# Patient Record
Sex: FEMALE | Race: WHITE | Employment: OTHER | URBAN - METROPOLITAN AREA
[De-identification: names, ages, dates, MRNs, and addresses within clinical notes are randomized per-mention and may not be internally consistent; named-entity substitution may affect disease eponyms.]

---

## 2017-03-02 ENCOUNTER — HOSPITAL ENCOUNTER (INPATIENT)
Age: 67
LOS: 4 days | Discharge: NURSING, REHAB OR LONG-TERM CARE | DRG: 494 | End: 2017-03-06
Attending: ORTHOPAEDIC SURGERY | Admitting: ORTHOPAEDIC SURGERY
Payer: MEDICARE

## 2017-03-02 ENCOUNTER — APPOINTMENT (OUTPATIENT)
Dept: GENERAL RADIOLOGY | Age: 67
DRG: 494 | End: 2017-03-02
Attending: ORTHOPAEDIC SURGERY
Payer: MEDICARE

## 2017-03-02 PROBLEM — S82.892A BILATERAL ANKLE FRACTURES: Status: ACTIVE | Noted: 2017-03-02

## 2017-03-02 PROBLEM — S82.891A BILATERAL ANKLE FRACTURES: Status: ACTIVE | Noted: 2017-03-02

## 2017-03-02 LAB
ANION GAP BLD CALC-SCNC: 5 MMOL/L (ref 5–15)
ATRIAL RATE: 70 BPM
BASOPHILS # BLD AUTO: 0 K/UL (ref 0–0.1)
BASOPHILS # BLD: 0 % (ref 0–1)
BUN SERPL-MCNC: 15 MG/DL (ref 6–20)
BUN/CREAT SERPL: 20 (ref 12–20)
CALCIUM SERPL-MCNC: 8.9 MG/DL (ref 8.5–10.1)
CALCULATED P AXIS, ECG09: 66 DEGREES
CALCULATED R AXIS, ECG10: 70 DEGREES
CALCULATED T AXIS, ECG11: 62 DEGREES
CHLORIDE SERPL-SCNC: 101 MMOL/L (ref 97–108)
CO2 SERPL-SCNC: 30 MMOL/L (ref 21–32)
CREAT SERPL-MCNC: 0.74 MG/DL (ref 0.55–1.02)
DIAGNOSIS, 93000: NORMAL
DIFFERENTIAL METHOD BLD: NORMAL
EOSINOPHIL # BLD: 0.1 K/UL (ref 0–0.4)
EOSINOPHIL NFR BLD: 1 % (ref 0–7)
ERYTHROCYTE [DISTWIDTH] IN BLOOD BY AUTOMATED COUNT: 14.3 % (ref 11.5–14.5)
GLUCOSE SERPL-MCNC: 97 MG/DL (ref 65–100)
HCT VFR BLD AUTO: 41.3 % (ref 35–47)
HGB BLD-MCNC: 14.2 G/DL (ref 11.5–16)
LYMPHOCYTES # BLD AUTO: 25 % (ref 12–49)
LYMPHOCYTES # BLD: 2.2 K/UL (ref 0.8–3.5)
MCH RBC QN AUTO: 30.9 PG (ref 26–34)
MCHC RBC AUTO-ENTMCNC: 34.4 G/DL (ref 30–36.5)
MCV RBC AUTO: 89.8 FL (ref 80–99)
MONOCYTES # BLD: 0.5 K/UL (ref 0–1)
MONOCYTES NFR BLD AUTO: 6 % (ref 5–13)
NEUTS SEG # BLD: 5.8 K/UL (ref 1.8–8)
NEUTS SEG NFR BLD AUTO: 68 % (ref 32–75)
P-R INTERVAL, ECG05: 126 MS
PLATELET # BLD AUTO: 302 K/UL (ref 150–400)
POTASSIUM SERPL-SCNC: NORMAL MMOL/L (ref 3.5–5.1)
Q-T INTERVAL, ECG07: 424 MS
QRS DURATION, ECG06: 90 MS
QTC CALCULATION (BEZET), ECG08: 457 MS
RBC # BLD AUTO: 4.6 M/UL (ref 3.8–5.2)
RBC MORPH BLD: NORMAL
RBC MORPH BLD: NORMAL
SODIUM SERPL-SCNC: 136 MMOL/L (ref 136–145)
VENTRICULAR RATE, ECG03: 70 BPM
WBC # BLD AUTO: 8.6 K/UL (ref 3.6–11)
WBC MORPH BLD: NORMAL

## 2017-03-02 PROCEDURE — 85025 COMPLETE CBC W/AUTO DIFF WBC: CPT | Performed by: ORTHOPAEDIC SURGERY

## 2017-03-02 PROCEDURE — 0QSG04Z REPOSITION RIGHT TIBIA WITH INTERNAL FIXATION DEVICE, OPEN APPROACH: ICD-10-PCS | Performed by: ORTHOPAEDIC SURGERY

## 2017-03-02 PROCEDURE — 36415 COLL VENOUS BLD VENIPUNCTURE: CPT | Performed by: ORTHOPAEDIC SURGERY

## 2017-03-02 PROCEDURE — 71010 XR CHEST PORT: CPT

## 2017-03-02 PROCEDURE — 80048 BASIC METABOLIC PNL TOTAL CA: CPT | Performed by: ORTHOPAEDIC SURGERY

## 2017-03-02 PROCEDURE — 74011250636 HC RX REV CODE- 250/636: Performed by: ORTHOPAEDIC SURGERY

## 2017-03-02 PROCEDURE — 93005 ELECTROCARDIOGRAM TRACING: CPT

## 2017-03-02 PROCEDURE — 0QSJ04Z REPOSITION RIGHT FIBULA WITH INTERNAL FIXATION DEVICE, OPEN APPROACH: ICD-10-PCS | Performed by: ORTHOPAEDIC SURGERY

## 2017-03-02 PROCEDURE — 65270000029 HC RM PRIVATE

## 2017-03-02 PROCEDURE — 74011250637 HC RX REV CODE- 250/637: Performed by: ORTHOPAEDIC SURGERY

## 2017-03-02 RX ORDER — HYDROCODONE BITARTRATE AND ACETAMINOPHEN 5; 325 MG/1; MG/1
1 TABLET ORAL
Status: DISCONTINUED | OUTPATIENT
Start: 2017-03-02 | End: 2017-03-03 | Stop reason: SDUPTHER

## 2017-03-02 RX ORDER — SODIUM CHLORIDE 0.9 % (FLUSH) 0.9 %
5 SYRINGE (ML) INJECTION EVERY 8 HOURS
Status: DISCONTINUED | OUTPATIENT
Start: 2017-03-02 | End: 2017-03-06 | Stop reason: HOSPADM

## 2017-03-02 RX ORDER — HYDROCODONE BITARTRATE AND ACETAMINOPHEN 5; 325 MG/1; MG/1
2 TABLET ORAL
Status: DISCONTINUED | OUTPATIENT
Start: 2017-03-02 | End: 2017-03-06 | Stop reason: HOSPADM

## 2017-03-02 RX ORDER — SODIUM CHLORIDE 0.9 % (FLUSH) 0.9 %
5 SYRINGE (ML) INJECTION AS NEEDED
Status: DISCONTINUED | OUTPATIENT
Start: 2017-03-02 | End: 2017-03-06 | Stop reason: HOSPADM

## 2017-03-02 RX ORDER — SODIUM CHLORIDE 9 MG/ML
50 INJECTION, SOLUTION INTRAVENOUS CONTINUOUS
Status: DISCONTINUED | OUTPATIENT
Start: 2017-03-02 | End: 2017-03-03

## 2017-03-02 RX ADMIN — SODIUM CHLORIDE 50 ML/HR: 900 INJECTION, SOLUTION INTRAVENOUS at 15:57

## 2017-03-02 RX ADMIN — HYDROCODONE BITARTRATE AND ACETAMINOPHEN 2 TABLET: 5; 325 TABLET ORAL at 21:38

## 2017-03-02 RX ADMIN — Medication 5 ML: at 16:01

## 2017-03-02 RX ADMIN — HYDROCODONE BITARTRATE AND ACETAMINOPHEN 2 TABLET: 5; 325 TABLET ORAL at 17:52

## 2017-03-02 NOTE — PROGRESS NOTES
Bedside and Verbal shift change report given to Martine Potts  (oncoming nurse) by Armani Mcguire (offgoing nurse). Report included the following information SBAR and Kardex.

## 2017-03-03 ENCOUNTER — APPOINTMENT (OUTPATIENT)
Dept: GENERAL RADIOLOGY | Age: 67
DRG: 494 | End: 2017-03-03
Attending: ORTHOPAEDIC SURGERY
Payer: MEDICARE

## 2017-03-03 ENCOUNTER — ANESTHESIA (OUTPATIENT)
Dept: SURGERY | Age: 67
DRG: 494 | End: 2017-03-03
Payer: MEDICARE

## 2017-03-03 ENCOUNTER — ANESTHESIA EVENT (OUTPATIENT)
Dept: SURGERY | Age: 67
DRG: 494 | End: 2017-03-03
Payer: MEDICARE

## 2017-03-03 PROCEDURE — 76000 FLUOROSCOPY <1 HR PHYS/QHP: CPT

## 2017-03-03 PROCEDURE — 77030002916 HC SUT ETHLN J&J -A: Performed by: ORTHOPAEDIC SURGERY

## 2017-03-03 PROCEDURE — 77030016472 HC BIT DRL QC2 SYNT -C: Performed by: ORTHOPAEDIC SURGERY

## 2017-03-03 PROCEDURE — 74011250636 HC RX REV CODE- 250/636

## 2017-03-03 PROCEDURE — 74011250637 HC RX REV CODE- 250/637: Performed by: ORTHOPAEDIC SURGERY

## 2017-03-03 PROCEDURE — C1713 ANCHOR/SCREW BN/BN,TIS/BN: HCPCS | Performed by: ORTHOPAEDIC SURGERY

## 2017-03-03 PROCEDURE — 76010000149 HC OR TIME 1 TO 1.5 HR: Performed by: ORTHOPAEDIC SURGERY

## 2017-03-03 PROCEDURE — 94640 AIRWAY INHALATION TREATMENT: CPT

## 2017-03-03 PROCEDURE — 74011250636 HC RX REV CODE- 250/636: Performed by: ORTHOPAEDIC SURGERY

## 2017-03-03 PROCEDURE — 65270000029 HC RM PRIVATE

## 2017-03-03 PROCEDURE — 77030003862 HC BIT DRL SYNT -B: Performed by: ORTHOPAEDIC SURGERY

## 2017-03-03 PROCEDURE — 77030011640 HC PAD GRND REM COVD -A: Performed by: ORTHOPAEDIC SURGERY

## 2017-03-03 PROCEDURE — 77030000032 HC CUF TRNQT ZIMM -B: Performed by: ORTHOPAEDIC SURGERY

## 2017-03-03 PROCEDURE — 74011000250 HC RX REV CODE- 250: Performed by: ORTHOPAEDIC SURGERY

## 2017-03-03 PROCEDURE — 73600 X-RAY EXAM OF ANKLE: CPT

## 2017-03-03 PROCEDURE — 77030031139 HC SUT VCRL2 J&J -A: Performed by: ORTHOPAEDIC SURGERY

## 2017-03-03 PROCEDURE — 77030028224 HC PDNG CST BSNM -A: Performed by: ORTHOPAEDIC SURGERY

## 2017-03-03 PROCEDURE — 74011000250 HC RX REV CODE- 250

## 2017-03-03 PROCEDURE — 76210000016 HC OR PH I REC 1 TO 1.5 HR: Performed by: ORTHOPAEDIC SURGERY

## 2017-03-03 PROCEDURE — 77030018836 HC SOL IRR NACL ICUM -A: Performed by: ORTHOPAEDIC SURGERY

## 2017-03-03 PROCEDURE — 74011250636 HC RX REV CODE- 250/636: Performed by: ANESTHESIOLOGY

## 2017-03-03 PROCEDURE — 76060000033 HC ANESTHESIA 1 TO 1.5 HR: Performed by: ORTHOPAEDIC SURGERY

## 2017-03-03 PROCEDURE — 77030010509 HC AIRWY LMA MSK TELE -A: Performed by: ANESTHESIOLOGY

## 2017-03-03 PROCEDURE — 77030002933 HC SUT MCRYL J&J -A: Performed by: ORTHOPAEDIC SURGERY

## 2017-03-03 PROCEDURE — 77030016470 HC BIT DRL QC1 SYNT -C: Performed by: ORTHOPAEDIC SURGERY

## 2017-03-03 PROCEDURE — 77030004164 HC PLT SYNT -C: Performed by: ORTHOPAEDIC SURGERY

## 2017-03-03 DEVICE — 3.5MM CORTEX SCREW SELF-TAPPING 18MM: Type: IMPLANTABLE DEVICE | Site: ANKLE | Status: FUNCTIONAL

## 2017-03-03 DEVICE — 3.5MM CORTEX SCREW SELF-TAPPING 14MM: Type: IMPLANTABLE DEVICE | Site: ANKLE | Status: FUNCTIONAL

## 2017-03-03 DEVICE — 3.5MM LOCKING SCREW SLF-TPNG W/STARDRIVE(TM) RECESS 12MM: Type: IMPLANTABLE DEVICE | Site: ANKLE | Status: FUNCTIONAL

## 2017-03-03 DEVICE — PLATE BNE L81MM 7 H S STL 1/3 TBLR LOK COMPR W/ CLLR FOR: Type: IMPLANTABLE DEVICE | Site: ANKLE | Status: FUNCTIONAL

## 2017-03-03 DEVICE — SCREW BNE L45MM DIA4MM CANC S STL PARTIALLY THRD SM HEX: Type: IMPLANTABLE DEVICE | Site: ANKLE | Status: FUNCTIONAL

## 2017-03-03 DEVICE — SCREW BNE L14MM DIA3.5MM CORT S STL ST LOK FULL THRD: Type: IMPLANTABLE DEVICE | Site: ANKLE | Status: FUNCTIONAL

## 2017-03-03 DEVICE — SCREW BNE L16MM DIA3.5MM CORT S STL ST NONCANNULATED LOK: Type: IMPLANTABLE DEVICE | Site: ANKLE | Status: FUNCTIONAL

## 2017-03-03 RX ORDER — ARFORMOTEROL TARTRATE 15 UG/2ML
15 SOLUTION RESPIRATORY (INHALATION)
Status: DISCONTINUED | OUTPATIENT
Start: 2017-03-03 | End: 2017-03-06 | Stop reason: HOSPADM

## 2017-03-03 RX ORDER — LIDOCAINE HYDROCHLORIDE 10 MG/ML
0.1 INJECTION, SOLUTION EPIDURAL; INFILTRATION; INTRACAUDAL; PERINEURAL AS NEEDED
Status: DISCONTINUED | OUTPATIENT
Start: 2017-03-03 | End: 2017-03-03 | Stop reason: HOSPADM

## 2017-03-03 RX ORDER — FENTANYL CITRATE 50 UG/ML
50 INJECTION, SOLUTION INTRAMUSCULAR; INTRAVENOUS AS NEEDED
Status: DISCONTINUED | OUTPATIENT
Start: 2017-03-03 | End: 2017-03-03 | Stop reason: HOSPADM

## 2017-03-03 RX ORDER — SODIUM CHLORIDE, SODIUM LACTATE, POTASSIUM CHLORIDE, CALCIUM CHLORIDE 600; 310; 30; 20 MG/100ML; MG/100ML; MG/100ML; MG/100ML
100 INJECTION, SOLUTION INTRAVENOUS CONTINUOUS
Status: DISCONTINUED | OUTPATIENT
Start: 2017-03-03 | End: 2017-03-03 | Stop reason: HOSPADM

## 2017-03-03 RX ORDER — HYDROCODONE BITARTRATE AND ACETAMINOPHEN 10; 325 MG/1; MG/1
1 TABLET ORAL
Status: DISCONTINUED | OUTPATIENT
Start: 2017-03-03 | End: 2017-03-06 | Stop reason: HOSPADM

## 2017-03-03 RX ORDER — CEFAZOLIN SODIUM 1 G/3ML
INJECTION, POWDER, FOR SOLUTION INTRAMUSCULAR; INTRAVENOUS AS NEEDED
Status: DISCONTINUED | OUTPATIENT
Start: 2017-03-03 | End: 2017-03-03 | Stop reason: HOSPADM

## 2017-03-03 RX ORDER — ATROPINE SULFATE 0.4 MG/ML
INJECTION, SOLUTION ENDOTRACHEAL; INTRAMEDULLARY; INTRAMUSCULAR; INTRAVENOUS; SUBCUTANEOUS AS NEEDED
Status: DISCONTINUED | OUTPATIENT
Start: 2017-03-03 | End: 2017-03-03 | Stop reason: HOSPADM

## 2017-03-03 RX ORDER — ACETAMINOPHEN 325 MG/1
650 TABLET ORAL
Status: DISCONTINUED | OUTPATIENT
Start: 2017-03-03 | End: 2017-03-06 | Stop reason: HOSPADM

## 2017-03-03 RX ORDER — ALBUTEROL SULFATE 0.83 MG/ML
2.5 SOLUTION RESPIRATORY (INHALATION)
Status: DISCONTINUED | OUTPATIENT
Start: 2017-03-03 | End: 2017-03-06 | Stop reason: HOSPADM

## 2017-03-03 RX ORDER — SODIUM CHLORIDE 0.9 % (FLUSH) 0.9 %
5-10 SYRINGE (ML) INJECTION AS NEEDED
Status: DISCONTINUED | OUTPATIENT
Start: 2017-03-03 | End: 2017-03-03 | Stop reason: HOSPADM

## 2017-03-03 RX ORDER — FLUTICASONE PROPIONATE AND SALMETEROL 100; 50 UG/1; UG/1
1 POWDER RESPIRATORY (INHALATION)
Status: DISCONTINUED | OUTPATIENT
Start: 2017-03-03 | End: 2017-03-03 | Stop reason: CLARIF

## 2017-03-03 RX ORDER — ONDANSETRON 2 MG/ML
4 INJECTION INTRAMUSCULAR; INTRAVENOUS
Status: DISCONTINUED | OUTPATIENT
Start: 2017-03-03 | End: 2017-03-06 | Stop reason: HOSPADM

## 2017-03-03 RX ORDER — LEVOTHYROXINE SODIUM 100 UG/1
100 TABLET ORAL
Status: DISCONTINUED | OUTPATIENT
Start: 2017-03-04 | End: 2017-03-06 | Stop reason: HOSPADM

## 2017-03-03 RX ORDER — MORPHINE SULFATE 2 MG/ML
2-4 INJECTION, SOLUTION INTRAMUSCULAR; INTRAVENOUS
Status: DISCONTINUED | OUTPATIENT
Start: 2017-03-03 | End: 2017-03-03 | Stop reason: ALTCHOICE

## 2017-03-03 RX ORDER — MIDAZOLAM HYDROCHLORIDE 1 MG/ML
1 INJECTION, SOLUTION INTRAMUSCULAR; INTRAVENOUS AS NEEDED
Status: DISCONTINUED | OUTPATIENT
Start: 2017-03-03 | End: 2017-03-03 | Stop reason: HOSPADM

## 2017-03-03 RX ORDER — HYDROMORPHONE HYDROCHLORIDE 1 MG/ML
0.5 INJECTION, SOLUTION INTRAMUSCULAR; INTRAVENOUS; SUBCUTANEOUS
Status: DISCONTINUED | OUTPATIENT
Start: 2017-03-03 | End: 2017-03-03 | Stop reason: HOSPADM

## 2017-03-03 RX ORDER — NALOXONE HYDROCHLORIDE 0.4 MG/ML
0.4 INJECTION, SOLUTION INTRAMUSCULAR; INTRAVENOUS; SUBCUTANEOUS AS NEEDED
Status: DISCONTINUED | OUTPATIENT
Start: 2017-03-03 | End: 2017-03-06 | Stop reason: HOSPADM

## 2017-03-03 RX ORDER — MORPHINE SULFATE 10 MG/ML
2 INJECTION, SOLUTION INTRAMUSCULAR; INTRAVENOUS
Status: DISCONTINUED | OUTPATIENT
Start: 2017-03-03 | End: 2017-03-03 | Stop reason: HOSPADM

## 2017-03-03 RX ORDER — SODIUM CHLORIDE 0.9 % (FLUSH) 0.9 %
5-10 SYRINGE (ML) INJECTION EVERY 8 HOURS
Status: DISCONTINUED | OUTPATIENT
Start: 2017-03-03 | End: 2017-03-06 | Stop reason: HOSPADM

## 2017-03-03 RX ORDER — MIDAZOLAM HYDROCHLORIDE 1 MG/ML
0.5 INJECTION, SOLUTION INTRAMUSCULAR; INTRAVENOUS
Status: DISCONTINUED | OUTPATIENT
Start: 2017-03-03 | End: 2017-03-03 | Stop reason: HOSPADM

## 2017-03-03 RX ORDER — SODIUM CHLORIDE 0.9 % (FLUSH) 0.9 %
5-10 SYRINGE (ML) INJECTION AS NEEDED
Status: DISCONTINUED | OUTPATIENT
Start: 2017-03-03 | End: 2017-03-06 | Stop reason: HOSPADM

## 2017-03-03 RX ORDER — ENOXAPARIN SODIUM 100 MG/ML
40 INJECTION SUBCUTANEOUS EVERY 24 HOURS
Status: DISCONTINUED | OUTPATIENT
Start: 2017-03-03 | End: 2017-03-06 | Stop reason: HOSPADM

## 2017-03-03 RX ORDER — SODIUM CHLORIDE 0.9 % (FLUSH) 0.9 %
5-10 SYRINGE (ML) INJECTION EVERY 8 HOURS
Status: DISCONTINUED | OUTPATIENT
Start: 2017-03-03 | End: 2017-03-03 | Stop reason: HOSPADM

## 2017-03-03 RX ORDER — HYDROMORPHONE HYDROCHLORIDE 1 MG/ML
1 INJECTION, SOLUTION INTRAMUSCULAR; INTRAVENOUS; SUBCUTANEOUS
Status: DISCONTINUED | OUTPATIENT
Start: 2017-03-03 | End: 2017-03-06 | Stop reason: HOSPADM

## 2017-03-03 RX ORDER — DEXAMETHASONE SODIUM PHOSPHATE 4 MG/ML
INJECTION, SOLUTION INTRA-ARTICULAR; INTRALESIONAL; INTRAMUSCULAR; INTRAVENOUS; SOFT TISSUE AS NEEDED
Status: DISCONTINUED | OUTPATIENT
Start: 2017-03-03 | End: 2017-03-03 | Stop reason: HOSPADM

## 2017-03-03 RX ORDER — FENTANYL CITRATE 50 UG/ML
25 INJECTION, SOLUTION INTRAMUSCULAR; INTRAVENOUS
Status: DISCONTINUED | OUTPATIENT
Start: 2017-03-03 | End: 2017-03-03 | Stop reason: HOSPADM

## 2017-03-03 RX ORDER — BUDESONIDE 0.5 MG/2ML
500 INHALANT ORAL
Status: DISCONTINUED | OUTPATIENT
Start: 2017-03-03 | End: 2017-03-06 | Stop reason: HOSPADM

## 2017-03-03 RX ORDER — CEFAZOLIN SODIUM IN 0.9 % NACL 2 G/50 ML
2 INTRAVENOUS SOLUTION, PIGGYBACK (ML) INTRAVENOUS EVERY 8 HOURS
Status: COMPLETED | OUTPATIENT
Start: 2017-03-04 | End: 2017-03-04

## 2017-03-03 RX ORDER — ALBUTEROL SULFATE 90 UG/1
2 AEROSOL, METERED RESPIRATORY (INHALATION)
Status: DISCONTINUED | OUTPATIENT
Start: 2017-03-03 | End: 2017-03-03 | Stop reason: CLARIF

## 2017-03-03 RX ORDER — SODIUM CHLORIDE 9 MG/ML
50 INJECTION, SOLUTION INTRAVENOUS CONTINUOUS
Status: DISPENSED | OUTPATIENT
Start: 2017-03-03 | End: 2017-03-04

## 2017-03-03 RX ORDER — PHENYLEPHRINE HCL IN 0.9% NACL 0.4MG/10ML
SYRINGE (ML) INTRAVENOUS AS NEEDED
Status: DISCONTINUED | OUTPATIENT
Start: 2017-03-03 | End: 2017-03-03 | Stop reason: HOSPADM

## 2017-03-03 RX ORDER — MIDAZOLAM HYDROCHLORIDE 1 MG/ML
INJECTION, SOLUTION INTRAMUSCULAR; INTRAVENOUS AS NEEDED
Status: DISCONTINUED | OUTPATIENT
Start: 2017-03-03 | End: 2017-03-03 | Stop reason: HOSPADM

## 2017-03-03 RX ORDER — DIPHENHYDRAMINE HYDROCHLORIDE 50 MG/ML
12.5 INJECTION, SOLUTION INTRAMUSCULAR; INTRAVENOUS AS NEEDED
Status: DISCONTINUED | OUTPATIENT
Start: 2017-03-03 | End: 2017-03-03 | Stop reason: HOSPADM

## 2017-03-03 RX ORDER — ROPIVACAINE HYDROCHLORIDE 5 MG/ML
30 INJECTION, SOLUTION EPIDURAL; INFILTRATION; PERINEURAL AS NEEDED
Status: DISCONTINUED | OUTPATIENT
Start: 2017-03-03 | End: 2017-03-03 | Stop reason: HOSPADM

## 2017-03-03 RX ORDER — LIDOCAINE HYDROCHLORIDE 20 MG/ML
INJECTION, SOLUTION EPIDURAL; INFILTRATION; INTRACAUDAL; PERINEURAL AS NEEDED
Status: DISCONTINUED | OUTPATIENT
Start: 2017-03-03 | End: 2017-03-03 | Stop reason: HOSPADM

## 2017-03-03 RX ORDER — ONDANSETRON 2 MG/ML
4 INJECTION INTRAMUSCULAR; INTRAVENOUS AS NEEDED
Status: DISCONTINUED | OUTPATIENT
Start: 2017-03-03 | End: 2017-03-03 | Stop reason: HOSPADM

## 2017-03-03 RX ORDER — ONDANSETRON 2 MG/ML
INJECTION INTRAMUSCULAR; INTRAVENOUS AS NEEDED
Status: DISCONTINUED | OUTPATIENT
Start: 2017-03-03 | End: 2017-03-03 | Stop reason: HOSPADM

## 2017-03-03 RX ORDER — PROPOFOL 10 MG/ML
INJECTION, EMULSION INTRAVENOUS AS NEEDED
Status: DISCONTINUED | OUTPATIENT
Start: 2017-03-03 | End: 2017-03-03 | Stop reason: HOSPADM

## 2017-03-03 RX ORDER — SODIUM CHLORIDE 9 MG/ML
25 INJECTION, SOLUTION INTRAVENOUS CONTINUOUS
Status: DISCONTINUED | OUTPATIENT
Start: 2017-03-03 | End: 2017-03-03 | Stop reason: HOSPADM

## 2017-03-03 RX ADMIN — LIDOCAINE HYDROCHLORIDE 60 MG: 20 INJECTION, SOLUTION EPIDURAL; INFILTRATION; INTRACAUDAL; PERINEURAL at 15:33

## 2017-03-03 RX ADMIN — MIDAZOLAM HYDROCHLORIDE 2 MG: 1 INJECTION, SOLUTION INTRAMUSCULAR; INTRAVENOUS at 15:25

## 2017-03-03 RX ADMIN — CEFAZOLIN 2 G: 1 INJECTION, POWDER, FOR SOLUTION INTRAMUSCULAR; INTRAVENOUS; PARENTERAL at 23:01

## 2017-03-03 RX ADMIN — ATROPINE SULFATE 0.2 MG: 0.4 INJECTION, SOLUTION ENDOTRACHEAL; INTRAMEDULLARY; INTRAMUSCULAR; INTRAVENOUS; SUBCUTANEOUS at 16:01

## 2017-03-03 RX ADMIN — PROPOFOL 200 MG: 10 INJECTION, EMULSION INTRAVENOUS at 15:33

## 2017-03-03 RX ADMIN — HYDROCODONE BITARTRATE AND ACETAMINOPHEN 1 TABLET: 5; 325 TABLET ORAL at 07:20

## 2017-03-03 RX ADMIN — FENTANYL CITRATE 50 MCG: 50 INJECTION, SOLUTION INTRAMUSCULAR; INTRAVENOUS at 15:16

## 2017-03-03 RX ADMIN — Medication 80 MCG: at 15:38

## 2017-03-03 RX ADMIN — ATROPINE SULFATE 0.2 MG: 0.4 INJECTION, SOLUTION ENDOTRACHEAL; INTRAMEDULLARY; INTRAMUSCULAR; INTRAVENOUS; SUBCUTANEOUS at 15:55

## 2017-03-03 RX ADMIN — ENOXAPARIN SODIUM 40 MG: 40 INJECTION SUBCUTANEOUS at 19:24

## 2017-03-03 RX ADMIN — CEFAZOLIN SODIUM 2 G: 1 INJECTION, POWDER, FOR SOLUTION INTRAMUSCULAR; INTRAVENOUS at 15:40

## 2017-03-03 RX ADMIN — ONDANSETRON 4 MG: 2 INJECTION INTRAMUSCULAR; INTRAVENOUS at 15:44

## 2017-03-03 RX ADMIN — HYDROCODONE BITARTRATE AND ACETAMINOPHEN 1 TABLET: 5; 325 TABLET ORAL at 11:12

## 2017-03-03 RX ADMIN — Medication 2 MG: at 01:25

## 2017-03-03 RX ADMIN — Medication 2 MG: at 13:26

## 2017-03-03 RX ADMIN — SODIUM CHLORIDE 50 ML/HR: 900 INJECTION, SOLUTION INTRAVENOUS at 21:15

## 2017-03-03 RX ADMIN — DEXAMETHASONE SODIUM PHOSPHATE 4 MG: 4 INJECTION, SOLUTION INTRA-ARTICULAR; INTRALESIONAL; INTRAMUSCULAR; INTRAVENOUS; SOFT TISSUE at 15:44

## 2017-03-03 RX ADMIN — BUDESONIDE 500 MCG: 0.5 INHALANT RESPIRATORY (INHALATION) at 19:05

## 2017-03-03 RX ADMIN — Medication 40 MCG: at 16:00

## 2017-03-03 RX ADMIN — SODIUM CHLORIDE, SODIUM LACTATE, POTASSIUM CHLORIDE, AND CALCIUM CHLORIDE 100 ML/HR: 600; 310; 30; 20 INJECTION, SOLUTION INTRAVENOUS at 14:50

## 2017-03-03 RX ADMIN — ARFORMOTEROL TARTRATE 15 MCG: 15 SOLUTION RESPIRATORY (INHALATION) at 19:05

## 2017-03-03 RX ADMIN — SODIUM CHLORIDE 50 ML/HR: 900 INJECTION, SOLUTION INTRAVENOUS at 11:12

## 2017-03-03 RX ADMIN — Medication 2 MG: at 04:01

## 2017-03-03 RX ADMIN — Medication 2 MG: at 09:23

## 2017-03-03 RX ADMIN — Medication 2 MG: at 06:15

## 2017-03-03 RX ADMIN — MIDAZOLAM HYDROCHLORIDE 2 MG: 1 INJECTION, SOLUTION INTRAMUSCULAR; INTRAVENOUS at 15:15

## 2017-03-03 RX ADMIN — HYDROCODONE BITARTRATE AND ACETAMINOPHEN 1 TABLET: 10; 325 TABLET ORAL at 21:15

## 2017-03-03 NOTE — OP NOTES
[address] [facility] [facility] [address]   OP NOTE       Name:  Quentin Guzman   MR#:  [id number]   :  1950   Account #:  [de-identified]    Surgery Date:  2017   Date of Adm:  2017       PREOPERATIVE DIAGNOSIS: Right trimalleolar ankle fracture. POSTOPERATIVE DIAGNOSIS: Right trimalleolar ankle fracture. PROCEDURES PERFORMED: Open reduction and internal fixation,   right trimalleolar ankle fracture. SURGEON: Christine Young. Carmine Ya MD    ANESTHESIA: General.    ESTIMATED BLOOD LOSS: Minimal.    COMPLICATIONS: None. TOTAL TOURNIQUET TIME: 39 minutes, right lower extremity. IMPLANTS: Synthes plates and screws. SPECIMENS REMOVED: none    DISPOSITION: The patient was taken to the recovery room in stable   condition. OPERATIVE INDICATIONS: The patient is a 79-year-old female who   had an injury earlier this week. She was seen at an outside hospital.   Unfortunately, she had a fracture dislocation of her right ankle, which   was reduced and splinted, and she was sent out. When she presented   to my office, we also discovered that she had a 5th metatarsal fracture   of her contralateral left foot. She had had a very difficult time getting   around. She lives alone. We ended up admitting her to the hospital to   take care of all of her injuries. We elevated the right leg. She certainly   would require surgery. We discussed the risks and benefits, and she   wished to go forward with surgical fixation of her right ankle injury, and   informed consent was obtained. OPERATIVE PROCEDURE: The patient was identified in the   preoperative holding area. Right lower extremity was marked with the   patient. All preoperative questions were answered. She was seen by   the anesthesia department, taken to the operating room, transferred to   the operating table in supine position. Once appropriate anesthesia   was obtained, a tourniquet was placed on the right thigh. The right leg   was prepped and draped in the usual sterile fashion. Time-out was   conducted indicating correct operative site and preoperative antibiotics   given prior to incision being made. Next, the leg was elevated,   tourniquet was inflated. Fluoroscopy was brought in and used   throughout the case. Pictures were taken showing unstable ankle   fracture. An incision was made first medially, and dissected down to   the medial malleolus fracture, which was quite comminuted. It was   thoroughly irrigated out, debrided, and prepared for fixation. A pointed   reduction clamp was placed across it with a K-wire as well to secure it,   and my attention was turned lateral. An incision was made over the   fibula, dissected down to an oblique fibular fracture, which was   curetted out, irrigated and prepared for fixation by pulling in-line   traction and using a lobster claw clamp. It was anatomically reduced. A   3.5 lag screw was placed across it. Images were taken in AP and   lateral planes showing appropriate alignment. There was a posterior   malleolar fracture, which was well reduced with the fibula, and was in   good position. A 7-hole, 1/3 tubular plate was placed on the lateral   portion of the fibula. A nonlocking screw was placed first to compress   the plate to the screw, then several locking screws were placed. My   attention was turned back medial, where 2 partially threaded   cancellous screws were placed across the medial malleolus. The K-  wire and reduction clamp were removed. The ankle was then stressed   under Cotton test live fluoroscopy, and was quite stable. No further   fixation was needed. Final images were taken in all planes showing   appropriate alignment of the fracture and placement of hardware. Incisions were irrigated with saline, closed with Vicryl sutures,   Monocryl sutures and nylon sutures in the skin. Sterile dressings were   placed. Bulky De La Torre splint was placed.  The patient was awakened and   taken to the recovery room in stable condition.         MD [facility]   D:  03/03/2017   17:23   T:  03/03/2017   18:01   Job #:  [id number]

## 2017-03-03 NOTE — PERIOP NOTES
Patient: Daria Mayorga MRN: [id number]  SSN: [id number]   YOB: 1950  Age: 79 y.o. Sex: female     Patient is status post Procedure(s):  ANKLE OPEN REDUCTION INTERNAL FIXATION- RIGHT. Surgeon(s) and Role:     * Tanmay Ngo MD - Primary    Local/Dose/Irrigation:  N/A                  Peripheral IV 03/02/17 Left Forearm (Active)   Site Assessment Clean, dry, & intact 3/3/2017  9:48 AM   Phlebitis Assessment 0 3/3/2017  9:48 AM   Infiltration Assessment 0 3/3/2017  9:48 AM   Dressing Status Clean, dry, & intact 3/3/2017  9:48 AM   Dressing Type Transparent 3/3/2017  9:48 AM   Hub Color/Line Status Infusing 3/3/2017  9:48 AM   Alcohol Cap Used Yes 3/3/2017  9:48 AM                           Dressing/Packing:  Wound Ankle Right-DRESSING TYPE: 4 x 4;Cast padding;Elastic bandage; Sutures; Xeroform (03/03/17 1612)  Splint/Cast: Wound Ankle Right-SPLINT TYPE/MATERIAL: Splint, plaster]    Other:

## 2017-03-03 NOTE — PROGRESS NOTES
0025: Paged on call ortho. 0041: 2nd page to on call ortho. 1959: Dr. Peter Guthrie notified of pt pain level 8/9 out of 10. Verbal order to give morphine 2 mg-4 mg IV every 2 hours prn.

## 2017-03-03 NOTE — PROGRESS NOTES
Talked to [facility] [employer] in holding area; gave report per SBAR, MAR, kardex; pre procedure checklist complete and consent has been signed; legs wiped with CHG

## 2017-03-03 NOTE — ROUTINE PROCESS
Bedside shift change report given to Artemio dias RN (oncoming nurse) by Spenser Asher RN(offgoing nurse). Report given with SBAR.

## 2017-03-03 NOTE — ANESTHESIA PREPROCEDURE EVALUATION
Anesthetic History   No history of anesthetic complications            Review of Systems / Medical History  Patient summary reviewed, nursing notes reviewed and pertinent labs reviewed    Pulmonary  Within defined limits          Asthma        Neuro/Psych   Within defined limits           Cardiovascular  Within defined limits                Exercise tolerance: >4 METS     GI/Hepatic/Renal  Within defined limits              Endo/Other      Hypothyroidism  Arthritis and cancer     Other Findings              Physical Exam    Airway  Mallampati: II  TM Distance: 4 - 6 cm  Neck ROM: normal range of motion   Mouth opening: Normal     Cardiovascular  Regular rate and rhythm,  S1 and S2 normal,  no murmur, click, rub, or gallop             Dental  No notable dental hx       Pulmonary  Breath sounds clear to auscultation               Abdominal  GI exam deferred       Other Findings            Anesthetic Plan    ASA: 2  Anesthesia type: general      Post-op pain plan if not by surgeon: peripheral nerve block single    Induction: Intravenous  Anesthetic plan and risks discussed with: Patient

## 2017-03-03 NOTE — PROGRESS NOTES
Bedside and Verbal shift change report given to Claudette RN (oncoming nurse) by Eddie Ortiz RN (offgoing nurse). Report included the following information SBAR, Kardex, ED Summary, Intake/Output, MAR and Recent Results.

## 2017-03-03 NOTE — PROGRESS NOTES
TRANSFER - OUT REPORT:    Verbal report given to Chin Oneil RN on [employer]  being transferred to OR holding) for ordered procedure       Report consisted of patients Situation, Background, Assessment and   Recommendations(SBAR). Information from the following report(s) SBAR, Kardex and MAR was reviewed with the receiving nurse. Lines:   Peripheral IV 03/02/17 Left Forearm (Active)   Site Assessment Clean, dry, & intact 3/3/2017  9:48 AM   Phlebitis Assessment 0 3/3/2017  9:48 AM   Infiltration Assessment 0 3/3/2017  9:48 AM   Dressing Status Clean, dry, & intact 3/3/2017  9:48 AM   Dressing Type Transparent 3/3/2017  9:48 AM   Hub Color/Line Status Infusing 3/3/2017  9:48 AM   Alcohol Cap Used Yes 3/3/2017  9:48 AM        Opportunity for questions and clarification was provided.       Patient transported with:   [employer]

## 2017-03-03 NOTE — PROGRESS NOTES
TRANSFER - IN REPORT:    Verbal report received from [id number] Julia Duggan, RN on [employer]  being received from PACU) for routine post - op      Report consisted of patients Situation, Background, Assessment and   Recommendations(SBAR). Information from the following report(s) SBAR, Kardex and OR Summary was reviewed with the receiving nurse. Opportunity for questions and clarification was provided. Assessment completed upon patients arrival to unit and care assumed.

## 2017-03-03 NOTE — PERIOP NOTES
TRANSFER - OUT REPORT:    Verbal report given to Анна CARDOZO(name) on [employer]  being transferred to 573(unit) for routine post - op       Report consisted of patients Situation, Background, Assessment and   Recommendations(SBAR). Time Pre op antibiotic given:1540  Anesthesia Stop time: na  Merino Present on Transfer to floor:n  Order for Merino on Chart:n    Information from the following report(s) SBAR and Kardex was reviewed with the receiving nurse. Opportunity for questions and clarification was provided. Is the patient on 02? NO       L/Min na       Other na    Is the patient on a monitor? NO    Is the nurse transporting with the patient? NO    Surgical Waiting Area notified of patient's transfer from PACU? YES      The following personal items collected during your admission accompanied patient upon transfer:   Dental Appliance: Dental Appliances: None  Vision: Visual Aid: Glasses, With patient  Hearing Aid:    Jewelry: Jewelry: None  Clothing: Clothing: At bedside  Other Valuables:  Other Valuables: Cell Phone, At bedside  Valuables sent to safe:

## 2017-03-03 NOTE — PROGRESS NOTES
Patient has arrived back on unit; took vital signs, reassessed nursing assessment; released orders; elevated right foot with ice

## 2017-03-03 NOTE — PROGRESS NOTES
Pt has R trimal ankle fracture dislocation, needs ORIF  Also left 5th metatarsal fx  Will affect recovery  Will need rehab post op  R leg has been elevated all night  Opened splint anterior  Skin look much better than yesterday  Plan for ORIF this afternoon  NPO

## 2017-03-03 NOTE — PROGRESS NOTES
Primary Nurse Nick Giang RN and Adriane Jackson RN performed a dual skin assessment on this patient No impairment noted  Peter score is 16

## 2017-03-03 NOTE — BRIEF OP NOTE
BRIEF OPERATIVE NOTE    Date of Procedure: 3/3/2017   Preoperative Diagnosis: fractured right ankle  Postoperative Diagnosis: fractured right ankle    Procedure(s):  ANKLE OPEN REDUCTION INTERNAL FIXATION- RIGHT  Surgeon(s) and Role:     * Ana Laura Manzano MD - Primary            Surgical Staff:  Circ-1: Trudi Cochran RN  Circ-2: Carrie Rivera RN  Radiology Technician: Lb Rosenthal RT, R  Scrub Tech-1: [facility]  Surg Asst-1: Carly Cortesalyse  Event Time In   Incision Start 1541   Incision Close 1622     Anesthesia: General   Estimated Blood Loss: min  Specimens: * No specimens in log *   Findings: as above   Complications: none  Implants:   Implant Name Type Inv.  Item Serial No.  Lot No. LRB No. Used Action   SCR BNE CRTX ST HEX 3.5X16MM -- SS - SN/A  SCR BNE CRTX ST HEX 3.5X16MM -- SS N/A SYNTHES [place] N/A Right 1 Implanted   SCR BNE CRTX ST HEX 3.5X18MM -- SS - SN/A  SCR BNE CRTX ST HEX 3.5X18MM -- SS N/A SYNTHES [place] N/A Right 1 Implanted   SCR BNE CRTX ST HEX 3.5X14MM -- SS - SN/A  SCR BNE CRTX ST HEX 3.5X14MM -- SS N/A SYNTHES [place] N/A Right 1 Implanted   SCR BNE LCK ST T25 3.5X12MM SS --  - SN/A  SCR BNE LCK ST T25 3.5X12MM SS --  N/A SYNTHES [place] N/A Right 1 Implanted   SCR BNE LCK ST T25 3.5X14MM SS --  - SN/A  SCR BNE LCK ST T25 3.5X14MM SS --  N/A SYNTHES [place] N/A Right 2 Implanted   SCR BNE CANC HEX PT 4X45MM SS --  - SN/A  SCR BNE CANC HEX PT 4X45MM SS --  N/A SYNTHES [place] N/A Right 2 Implanted   PLATE 1/3 TUBLR CLLR 7H 81MM --  - SN/A   PLATE 1/3 TUBLR CLLR 7H 81MM --  N/A SYNTHES [place] N/A Right 1 Implanted

## 2017-03-03 NOTE — ROUTINE PROCESS
TRANSFER - IN REPORT:    Verbal report received from [facility] on [employer]  being received from [phone] for ordered procedure      Report consisted of patients Situation, Background, Assessment and   Recommendations(SBAR). Information from the following report(s) SBAR was reviewed with the receiving nurse. Report given to [employer] for questions and clarification was provided. Assessment completed upon patients arrival to unit and care assumed.

## 2017-03-03 NOTE — PROGRESS NOTES
TRANSFER - IN REPORT:    Verbal report received from Sherita Rucker RN(name) on [employer]  being received from [address] [place] [address](unit) for routine progression of care      Report consisted of patients Situation, Background, Assessment and   Recommendations(SBAR). Information from the following report(s) SBAR, Kardex, Procedure Summary, Intake/Output, MAR and Recent Results was reviewed with the receiving nurse. Opportunity for questions and clarification was provided. Assessment completed upon patients arrival to unit and care assumed.

## 2017-03-04 PROCEDURE — 97530 THERAPEUTIC ACTIVITIES: CPT

## 2017-03-04 PROCEDURE — G8988 SELF CARE GOAL STATUS: HCPCS

## 2017-03-04 PROCEDURE — 97165 OT EVAL LOW COMPLEX 30 MIN: CPT

## 2017-03-04 PROCEDURE — 94640 AIRWAY INHALATION TREATMENT: CPT

## 2017-03-04 PROCEDURE — 65270000029 HC RM PRIVATE

## 2017-03-04 PROCEDURE — 97535 SELF CARE MNGMENT TRAINING: CPT

## 2017-03-04 PROCEDURE — 97161 PT EVAL LOW COMPLEX 20 MIN: CPT

## 2017-03-04 PROCEDURE — 74011000250 HC RX REV CODE- 250: Performed by: ORTHOPAEDIC SURGERY

## 2017-03-04 PROCEDURE — G8987 SELF CARE CURRENT STATUS: HCPCS

## 2017-03-04 PROCEDURE — 74011250636 HC RX REV CODE- 250/636: Performed by: ORTHOPAEDIC SURGERY

## 2017-03-04 PROCEDURE — 74011250637 HC RX REV CODE- 250/637: Performed by: ORTHOPAEDIC SURGERY

## 2017-03-04 RX ADMIN — BUDESONIDE 500 MCG: 0.5 INHALANT RESPIRATORY (INHALATION) at 20:00

## 2017-03-04 RX ADMIN — LEVOTHYROXINE SODIUM 100 MCG: 100 TABLET ORAL at 07:09

## 2017-03-04 RX ADMIN — BUDESONIDE 500 MCG: 0.5 INHALANT RESPIRATORY (INHALATION) at 09:04

## 2017-03-04 RX ADMIN — ARFORMOTEROL TARTRATE 15 MCG: 15 SOLUTION RESPIRATORY (INHALATION) at 20:00

## 2017-03-04 RX ADMIN — HYDROCODONE BITARTRATE AND ACETAMINOPHEN 1 TABLET: 10; 325 TABLET ORAL at 13:56

## 2017-03-04 RX ADMIN — ENOXAPARIN SODIUM 40 MG: 40 INJECTION SUBCUTANEOUS at 19:25

## 2017-03-04 RX ADMIN — HYDROCODONE BITARTRATE AND ACETAMINOPHEN 1 TABLET: 10; 325 TABLET ORAL at 22:06

## 2017-03-04 RX ADMIN — HYDROCODONE BITARTRATE AND ACETAMINOPHEN 1 TABLET: 5; 325 TABLET ORAL at 07:09

## 2017-03-04 RX ADMIN — ARFORMOTEROL TARTRATE 15 MCG: 15 SOLUTION RESPIRATORY (INHALATION) at 09:04

## 2017-03-04 RX ADMIN — CEFAZOLIN 2 G: 1 INJECTION, POWDER, FOR SOLUTION INTRAMUSCULAR; INTRAVENOUS; PARENTERAL at 07:09

## 2017-03-04 RX ADMIN — HYDROMORPHONE HYDROCHLORIDE 1 MG: 1 INJECTION, SOLUTION INTRAMUSCULAR; INTRAVENOUS; SUBCUTANEOUS at 19:29

## 2017-03-04 RX ADMIN — HYDROCODONE BITARTRATE AND ACETAMINOPHEN 1 TABLET: 10; 325 TABLET ORAL at 17:53

## 2017-03-04 NOTE — DISCHARGE INSTRUCTIONS
No weight Right leg   Maintain splint until follow up           Dr. Tovar Plain Postoperative Patient Instructions    1. Please maintain the dressing and/or splint placed at surgery until your follow up appointment. We will remove it at your appointment. Please keep the dressing clean and dry. If you have any questions or problems with your dressing, please call our office at [phone].  2. Please elevate the operative extremity to the level of the heart to keep swelling at a minimum. You may get up to move around, but when seated please keep the extremity elevated as much as possible. This will decrease swelling and postoperative pain. 3. If you were told to be non-weight bearing following surgery, please do not place the foot on the ground. You may use crutches or we can help arrange for a scooter for you to stay off of your operative leg. 4. If you are in a special shoe or boot and told that you may bear weight as tolerated, then you may do so, but please keep the extremity elevated when not moving around. 5. If you had a block from the Anesthesia doctor before your surgery, expect this to wear off around 12-24 hours after you received it and you should start taking your pain medication when the feeling begins to come back into your leg. 6. A prescription for pain medicine is provided. The key to pain control is staying ahead. For the first 48-72 hours after your surgery, you may want to take your pain medication of a regular schedule. After that, you may only need it on an as-needed basis. 7. Please begin taking one Enteric Coated Aspirin 325 mg daily on the morning after your surgery until you are told you do not need to do this anymore. This can lower the risk of developing a blood clot after surgery. If you are not sure if you can take an Aspirin daily, please check with your primary care doctor to verify.   8. Signs and symptoms of infection include: redness, increased pain, increased swelling not relieved by elevation, drainage, fever, or chills. If you develop any of these symptoms, call the office at [phone]. 9. Please follow-up at my office at 12-15 days after surgery or when specified at your pre-operative appointment. Please follow the [address] [place] [address] 10 Discharge Instructions provided to you by Dr. Allison Rendon for your medications.     DATE OF FOLLOW-UP APPOINTMENT: _____________________________________               [address] [address]    Azam Dhillon MD  3/4/2017

## 2017-03-04 NOTE — PROGRESS NOTES
Ortho Daily Progress Note    3/4/2017  9:53 AM    POD:  1 Day Post-Op  S/P:  Procedure(s):  ANKLE OPEN REDUCTION INTERNAL FIXATION- RIGHT  Cam boot walker for left MT fx- non-op  Afebrile/VSS  Doing well without complaints of nausea  Pain well controlled  Calves soft/NTTP Bilaterally. Dressing/splint clean and dry  Moving lower extremities well. Neurocirculatory exam intact and within normal range.     Lab Results   Component Value Date/Time    HGB 14.2 03/02/2017 04:36 PM         PLAN:  DVT prophylaxis: Lovenox with transition to ASA upon DC  NWB with PT-mobilization- may transfer/pivot on left  Pain Control  Plan to D/C wants to go to [facility] [place], [place]

## 2017-03-04 NOTE — PROGRESS NOTES
POD#1 s/p ORIF R ankle  Doing ok    R leg splint intact  Moving toes some but block still working some    May go to rehab  NWB R leg  Transfer on left only due to 5th mt fx

## 2017-03-04 NOTE — PROGRESS NOTES
Primary Nurse Mary Levy, RN and Susy Herrera RN performed a dual skin assessment on this patient No impairment noted  Peter score is 17

## 2017-03-04 NOTE — PROGRESS NOTES
Cm reviewed chart and noted patient had surgery yesterday for right ankle fracture. She is non weight bearing for 6 weeks. Therapy has evaluated and rehab being recommended. Patient's PCP is Josefina Glover--. No advance directive on chart. Cm met with patient in her room to introduce self and explain role. Patient was alert and oriented. . Confirmed the above and that she has not been to Dr Phoenix Gross office in over a year. She lives alone in one level home with 5 steps to enter. The home is not handicapped assessable. She said a wheelchair will not fit through her bathroom door. Her   in  at age 46 due to cancer. She is a retired nurse. Prior to falling and sustaining the ankle injuries, she was independent and self care. Assist with the care of her two granddaughters-- Her son Braulio Barraza and his wife live close to patient--[phone] and are supportive. Patient is a retired nurse. .    Patient [facility] when [facility] .   If she is not accepted for inpatient rehab, [facility]  and Re[facility] for [facility]--  Patient has Medicare and AARP supplemental.  Referrals were sent to both facilities via [employer]. Awaiting response. Patient will need ambulance transport to the facility of choice. [place] of choice letter completed and placed in chart. Care Management Interventions  PCP Verified by CM:  Yes  Palliative Care Consult (Criteria: CHF and RRAT>21): No  Mode of Transport at Discharge:  (TBD-likely BLS)  Transition of Care Consult (CM Consult): Discharge Planning  Current Support Network: Lives Alone (lives alone in one level home-- independent and self care prior to admission    no advance directive)  Confirm Follow Up Transport: Family (friends and family)  Plan discussed with Pt/Family/Caregiver: Yes  Freedom of Choice Offered: Yes (freedom of choice letter completed and placed in chart)  Discharge Location  Discharge Placement:  (TBD--inpatient rehab or Snf)

## 2017-03-04 NOTE — PROGRESS NOTES
Problem: Self Care Deficits Care Plan (Adult)  Goal: *Acute Goals and Plan of Care (Insert Text)  Occupational Therapy Goals  Initiated 3/4/2017    1. Patient will tolerate standing with RW support (maintain NWB RLE, heel WB only LLE) for 5 minutes during ADL tasks with SBA within 7 days. 2. Patient will perform stand pivot transfer to left to [facility] using RW with Gato within 7 days. 3. Patient will perform all aspects of toileting with Gato within 7 days. 4. Patient will perform LB dressing with Gato within 7 days. 5. Patient will participate in BUE therapeutic exercise with red resistance theraband with independence sitting EOB for 10 minutes within 7 days. OCCUPATIONAL THERAPY EVALUATION  Patient: Nereyda Herring (19 y.o. female)  Date: 3/4/2017  Primary Diagnosis: fx ankle  Bilateral ankle fractures  unknown  Procedure(s) (LRB):  ANKLE OPEN REDUCTION INTERNAL FIXATION- RIGHT (Right) 1 Day Post-Op   Precautions:   Fall (NWB RLE, heel only WB LLE)      ASSESSMENT :  Based on the objective data described below, the patient presents with bilateral ankle pain, impaired standing balance and tolerance d/t NWB RLE, heel WB only LLE, decreased functional reach to feet, following fall and ORIF RLE POD 1. Pt is overall IND to modA for self-care tasks. Pt with excellent bed mobility at supervision/Krisahn level, Gato for sit<>stand transfer in prep for ADLs and able to tolerate static standing using RW with CGA. Pt lives alone and is independent at baseline; house is not handicapped accessible. Pt educated on compensatory techniques for LB dressing (reacher, in-bed dressing) as well as transfer techniques to drop-arm [facility] and bedside chair. Recommend next session to address stand pivot transfer to [facility] using RW and BUE exercises with resistance band. Strongly recommend inpatient rehab at discharge; pt is extremely motivated and pleasant, will tolerate and greatly benefit from 3 hours therapy per day.      Patient will benefit from skilled intervention to address the above impairments. Patients rehabilitation potential is considered to be Excellent  Factors which may influence rehabilitation potential include:   [ ]             None noted  [ ]             Mental ability/status  [ ]             Medical condition  [X]             Home/family situation and support systems  [ ]             Safety awareness  [ ]             Pain tolerance/management  [ ]             Other:        PLAN :  Recommendations and Planned Interventions:  [X]               Self Care Training                  [X]        Therapeutic Activities  [X]               Functional Mobility Training    [ ]        Cognitive Retraining  [X]               Therapeutic Exercises           [X]        Endurance Activities  [X]               Balance Training                   [ ]        Neuromuscular Re-Education  [ ]               Visual/Perceptual Training     [X]   Home Safety Training  [X]               Patient Education                 [X]        Family Training/Education  [ ]               Other (comment):     Frequency/Duration: Patient will be followed by occupational therapy 5 times a week to address goals. Discharge Recommendations: Inpatient Rehab  Further Equipment Recommendations for Discharge: drop-arm bedside commode, TBD at rehab       SUBJECTIVE:   Patient stated Mango Lis this get easier?       OBJECTIVE DATA SUMMARY:   HISTORY:   Past Medical History:   Diagnosis Date    Arthritis       left thumb basilar joint arthritis    Asthma      Cancer ([facility] [address]       breast    Plantar fasciitis       bilateral    Seasonal allergies      Unspecified hypothyroidism       Past Surgical History:   Procedure Laterality Date    HX BREAST LUMPECTOMY   2011    HX GYN         hysterectomy    THYROIDECTOMY            Prior Level of Function/Home Situation: Pt lives alone, IND at baseline. Was walking sidewalk with granddaughters and fell off sidewalk.   Expanded or extensive additional review of patient history:      Home Situation  Home Environment: Private residence  # Steps to Enter: 5  Rails to Enter: Yes  Hand Rails : Bilateral  One/Two Story Residence: One story  Living Alone: Yes  Support Systems: Family member(s), Friends \ neighbors  Patient Expects to be Discharged to[de-identified] Rehabilitation facility  Current DME Used/Available at Home: Walker, rolling, Commode, bedside, Crutches  Tub or Shower Type: Tub/Shower combination  [X]  Right hand dominant             [ ]  Left hand dominant     EXAMINATION OF PERFORMANCE DEFICITS:  Cognitive/Behavioral Status:  Neurologic State: Alert; Appropriate for age  Orientation Level: Oriented X4  Cognition: Follows commands; Appropriate safety awareness; Appropriate for age attention/concentration; Appropriate decision making  Perception: Appears intact  Perseveration: No perseveration noted  Safety/Judgement: Awareness of environment; Fall prevention;Good awareness of safety precautions; Home safety; Insight into deficits  Skin: appears intact  Edema: none noted in BUEs  Vision/Perceptual:    Tracking: Able to track stimulus in all quadrants w/o difficulty                 Diplopia: No         Corrective Lenses: Glasses  Range of Motion:     AROM: Within functional limits  PROM: Within functional limits                    Strength:     Strength: Generally decreased, functional              Coordination:  Coordination: Within functional limits  Fine Motor Skills-Upper: Left Intact; Right Intact    Gross Motor Skills-Upper: Left Intact; Right Intact  Tone & Sensation:     Tone: Normal  Sensation: Intact                       Balance:  Sitting: Intact  Standing: Impaired; With support  Standing - Static: Fair  Standing - Dynamic : Fair     Functional Mobility and Transfers for ADLs:  Bed Mobility:  Rolling: Independent  Supine to Sit: Supervision  Sit to Supine: Supervision  Scooting: Supervision; Additional time     Transfers:  Sit to Stand: Minimum assistance; Additional time;Assist x1  Stand to Sit: Contact guard assistance; Additional time;Assist x1  Toilet Transfer : Moderate assistance;Assist x1 (inferred for squat pivot transfer)     ADL Assessment:  Feeding: Independent     Oral Facial Hygiene/Grooming: Independent     Bathing: Minimum assistance; Adaptive equipment; Additional time;Assist x1     Upper Body Dressing: Setup; Additional time     Lower Body Dressing: Maximum assistance; Additional time;Assist x1     Toileting: Moderate assistance; Adaptive equipment;Assist x1;Additional time                 ADL Intervention and task modifications:                                            Cognitive Retraining  Safety/Judgement: Awareness of environment; Fall prevention;Good awareness of safety precautions; Home safety; Insight into deficits        Functional Measure:  Barthel Index:      Bathin  Bladder: 10  Bowels: 10  Groomin  Dressin  Feeding: 10  Mobility: 0  Stairs: 0  Toilet Use: 5  Transfer (Bed to Chair and Back): 10  Total: 55         Barthel and G-code impairment scale:  Percentage of impairment CH  0% CI  1-19% CJ  20-39% CK  40-59% CL  60-79% CM  80-99% CN  100%   Barthel Score 0-100 100 99-80 79-60 59-40 20-39 1-19    0   Barthel Score 0-20 20 17-19 13-16 9-12 5-8 1-4 0      The Barthel ADL Index: Guidelines  1. The index should be used as a record of what a patient does, not as a record of what a patient could do. 2. The main aim is to establish degree of independence from any help, physical or verbal, however minor and for whatever reason. 3. The need for supervision renders the patient not independent. 4. A patient's performance should be established using the best available evidence. Asking the patient, friends/relatives and nurses are the usual sources, but direct observation and common sense are also important. However direct testing is not needed.   5. Usually the patient's performance over the preceding 24-48 hours is important, but occasionally longer periods will be relevant. 6. Middle categories imply that the patient supplies over 50 per cent of the effort. 7. Use of aids to be independent is allowed. Sergo Whiteside., Barthel, D.W. (9983). Functional evaluation: the Barthel Index. [facility] (14)2. KIAN Kilgore, Jeniffer Terrell., [place] [place], [address] [place] [address] Ave (1999). Measuring the change indisability after inpatient rehabilitation; comparison of the responsiveness of the Barthel Index and Functional [place] Measure. Journal of Neurology, Neurosurgery, and Psychiatry, 66(4), 357-751. Sergio Suazo, N.J.A, GOGO Melvin, & Zuly Lambert MTanjaA. (2004.) Assessment of post-stroke quality of life in cost-effectiveness studies: The usefulness of the Barthel Index and the EuroQoL-5D. Quality of Life Research, 13, 030-05            G codes: In compliance with CMSs Claims Based Outcome Reporting, the following G-code set was chosen for this patient based on their primary functional limitation being treated: The outcome measure chosen to determine the severity of the functional limitation was the Barthel Index with a score of 55/100 which was correlated with the impairment scale.       · Self Care:               - CURRENT STATUS:    CK - 40%-59% impaired, limited or restricted               - GOAL STATUS:           CI - 1%-19% impaired, limited or restricted               - D/C STATUS:                       ---------------To be determined---------------      Occupational Therapy Evaluation Charge Determination   History Examination Decision-Making   LOW Complexity : Brief history review  LOW Complexity : 1-3 performance deficits relating to physical, cognitive , or psychosocial skils that result in activity limitations and / or participation restrictions  LOW Complexity : No comorbidities that affect functional and no verbal or physical assistance needed to complete eval tasks       Based on the above components, the patient evaluation is determined to be of the following complexity level: LOW   Pain:  Pain Scale 1: Numeric (0 - 10)  Pain Intensity 1: 5  Pain Location 1: Ankle  Pain Orientation 1: Right     Pain Intervention(s) 1: Medication (see MAR)  Activity Tolerance:   VSS     Please refer to the flowsheet for vital signs taken during this treatment. After treatment:   [ ] Patient left in no apparent distress sitting up in chair  [X] Patient left in no apparent distress in bed  [X] Call bell left within reach  [X] Nursing notified  [ ] Caregiver present  [ ] Bed alarm activated      COMMUNICATION/EDUCATION:   The patients plan of care was discussed with: Physical Therapist, Registered Nurse and . [X] Home safety education was provided and the patient/caregiver indicated understanding. [X] Patient/family have participated as able in goal setting and plan of care. [X] Patient/family agree to work toward stated goals and plan of care. [ ] Patient understands intent and goals of therapy, but is neutral about his/her participation. [ ] Patient is unable to participate in goal setting and plan of care. This patients plan of care is appropriate for delegation to [facility].      Thank you for this referral.  Kristina Mcdonald, OT  Time Calculation: 23 mins

## 2017-03-04 NOTE — PROGRESS NOTES
Bedside and Verbal shift change report given to Isac Cano RN (oncoming nurse) by [facility], RN (offgoing nurse). Report included the following information SBAR, Kardex and MAR.

## 2017-03-04 NOTE — PROGRESS NOTES
Problem: Mobility Impaired (Adult and Pediatric)  Goal: *Acute Goals and Plan of Care (Insert Text)  Physical Therapy Goals  Initiated 3/4/2017  1. Patient will move from supine to sit and sit to supine in bed with independence within 3 day(s). 2. Patient will transfer from bed to chair and chair to bed with modified independence using the least restrictive device within 3 day(s). 3. Patient will perform sit to stand with modified independence within 3 day(s). 4. Patient will ambulate with moderate assistance for 50 feet with the least restrictive device within 3 day(s). 5. Patient will ascend/descend 5 stairs with 2 handrail(s) with moderate assistance within 3 day(s). PHYSICAL THERAPY EVALUATION  Patient: Joao Foster (75 y.o. female)  Date: 3/4/2017  Primary Diagnosis: fx ankle  Bilateral ankle fractures  unknown  Procedure(s) (LRB):  ANKLE OPEN REDUCTION INTERNAL FIXATION- RIGHT (Right) 1 Day Post-Op   Precautions: Heel stance L  Fall, NWB      ASSESSMENT :  Based on the objective data described below, the patient presents with pain, amulatory impairments, ROM impairments, strength deficits consistent with s/p ankle fractures. Her bed mobility is excellent overall but standing balance, and tolerance are low. She is unable to take steps due to being unable to support herself with arms and balance. Most likely DC to rehab due to home situation. Patient will benefit from skilled intervention to address the above impairments.   Patients rehabilitation potential is considered to be Good  Factors which may influence rehabilitation potential include:   [X]         None noted  [ ]         Mental ability/status  [ ]         Medical condition  [ ]         Home/family situation and support systems  [ ]         Safety awareness  [ ]         Pain tolerance/management  [ ]         Other:        PLAN :  Recommendations and Planned Interventions:  [X]           Bed Mobility Training             [ ] Neuromuscular Re-Education  [X]           Transfer Training                   [ ]    Orthotic/Prosthetic Training  [X]           Gait Training                         [ ]    Modalities  [X]           Therapeutic Exercises           [ ]    Edema Management/Control  [ ]           Therapeutic Activities            [ ]    Patient and Family Training/Education  [ ]           Other (comment):     Frequency/Duration: Patient will be followed by physical therapy  daily to address goals. Discharge Recommendations: Rehab  Further Equipment Recommendations for Discharge: TBD       SUBJECTIVE:   Patient stated I don't think I can go home.       OBJECTIVE DATA SUMMARY:   HISTORY:    Past Medical History:   Diagnosis Date    Arthritis       left thumb basilar joint arthritis    Asthma      Cancer ([facility] [address]       breast    Plantar fasciitis       bilateral    Seasonal allergies      Unspecified hypothyroidism       Past Surgical History:   Procedure Laterality Date    HX BREAST LUMPECTOMY   2011    HX GYN         hysterectomy    THYROIDECTOMY         Prior Level of Function/Home Situation: Lives alone in 1 story home with 5 steps to enter  Personal factors and/or comorbidities impacting plan of care:      Home Situation  Home Environment: Private residence  # Steps to Enter: 5  One/Two Story Residence: One story  Living Alone: Yes  Support Systems: Family member(s), Friends \ neighbors  Patient Expects to be Discharged to[de-identified] Rehabilitation facility  Current DME Used/Available at Home: Commode, bedside, Walker, rolling  Tub or Shower Type: Tub/Shower combination     EXAMINATION/PRESENTATION/DECISION MAKING:   Critical Behavior:  Neurologic State: Alert  Orientation Level: Oriented X4  Cognition: Appropriate decision making, Appropriate for age attention/concentration, Appropriate safety awareness, Follows commands     Skin:  Intact from what can be seen R and L feet  Strength:    Strength: Generally decreased, functional Tone & Sensation:                  Sensation: Intact      she feels pressure, light touch is reduced R LE         Range Of Motion:  AROM: Generally decreased, functional                       Coordination:        Functional Mobility:  Bed Mobility:  Rolling: Independent  Supine to Sit: Modified independent  Sit to Supine: Modified independent  Scooting: Modified independent;Setup  Transfers:  Sit to Stand: Minimum assistance;Assist x1  Stand to Sit: Contact guard assistance;Minimum assistance; Additional time;Assist x1                       Balance:   Sitting: Intact  Standing: Impaired; With support  Standing - Static: Fair  Standing - Dynamic : Poor  Ambulation/Gait Training:              Gait Description (WDL): Exceptions to WDL     Right Side Weight Bearing: Non-weight bearing  Left Side Weight Bearing: Heel  Base of Support: Narrowed                                      She cannot take steps              Stairs: Therapeutic Exercises:   She sat and dangled 5 min, then stood with RW 5 min, then sat another 5 minutes. Functional Measure:  Timed up and go:      Timed Get Up And Go Test: 0 (unable greater than 1 minute)      Timed Up and Go and G-code impairment scale:  Percentage of Impairment CH     0%    CI     1-19% CJ     20-39% CK     40-59% CL     60-79% CM     80-99% CN      100%   Timed   Score 0-56 10 11-12 13-14 15-16 17-18 19 20          < than 10 seconds=Normal  Greater then 13.5 seconds (in elderly)=Increased fall risk   Olegario CHILDRESS, Ilana Andrew. Predicting the probability for falls in community dwelling older adults using the Timed Up and Go Test. Phys Ther. 2000;80:896-903. G codes: In compliance with CMSs Claims Based Outcome Reporting, the following G-code set was chosen for this patient based on their primary functional limitation being treated:      The outcome measure chosen to determine the severity of the functional limitation was the TUG with a score of  0 - unable which was correlated with the impairment scale. · Mobility - Walking and Moving Around:               - CURRENT STATUS:    CN - 100% impaired, limited or restricted               - GOAL STATUS:           CM - 80%-99% impaired, limited or restricted               - D/C STATUS:                       ---------------To be determined---------------      Physical Therapy Evaluation Charge Determination   History Examination Presentation Decision-Making   LOW Complexity : Zero comorbidities / personal factors that will impact the outcome / POC MEDIUM Complexity : 3 Standardized tests and measures addressing body structure, function, activity limitation and / or participation in recreation  LOW Complexity : Stable, uncomplicated  HIGH Complexity : FOTO score of 1- 25       Based on the above components, the patient evaluation is determined to be of the following complexity level: LOW      Pain:  Pain Scale 1: Numeric (0 - 10)  Pain Intensity 1: 0  Pain Location 1: Ankle  Pain Orientation 1: Right     Pain Intervention(s) 1: Medication (see MAR)  Activity Tolerance:   Fair - after 15 min she was ready to lie back down, she could barely stand for 5 mintues without arms becoming very shaky. Please refer to the flowsheet for vital signs taken during this treatment. After treatment:   [ ]         Patient left in no apparent distress sitting up in chair  [X]         Patient left in no apparent distress in bed  [X]         Call bell left within reach  [X]         Nursing notified  [ ]         Caregiver present  [ ]         Bed alarm activated      COMMUNICATION/EDUCATION:   The patients plan of care was discussed with: Registered Nurse.  [X]         Fall prevention education was provided and the patient/caregiver indicated understanding. [X]         Patient/family have participated as able in goal setting and plan of care.   [ ]         Patient/family agree to work toward stated goals and plan of care. [ ]         Patient understands intent and goals of therapy, but is neutral about his/her participation. [ ]         Patient is unable to participate in goal setting and plan of care.      Thank you for this referral.  Jenniffer Duverney, PT   Time Calculation: 36 mins

## 2017-03-04 NOTE — PROGRESS NOTES
Bedside shift change report given to Sd Borges RN (oncoming nurse) by Isac Cano RN (offgoing nurse). Report given with SBAR, Kardex, Intake/Output and MAR.

## 2017-03-05 PROCEDURE — 97530 THERAPEUTIC ACTIVITIES: CPT

## 2017-03-05 PROCEDURE — 74011250637 HC RX REV CODE- 250/637: Performed by: ORTHOPAEDIC SURGERY

## 2017-03-05 PROCEDURE — 94640 AIRWAY INHALATION TREATMENT: CPT

## 2017-03-05 PROCEDURE — 74011000250 HC RX REV CODE- 250: Performed by: ORTHOPAEDIC SURGERY

## 2017-03-05 PROCEDURE — 74011250636 HC RX REV CODE- 250/636: Performed by: ORTHOPAEDIC SURGERY

## 2017-03-05 PROCEDURE — 65270000029 HC RM PRIVATE

## 2017-03-05 RX ADMIN — HYDROMORPHONE HYDROCHLORIDE 1 MG: 1 INJECTION, SOLUTION INTRAMUSCULAR; INTRAVENOUS; SUBCUTANEOUS at 00:51

## 2017-03-05 RX ADMIN — Medication 10 ML: at 20:47

## 2017-03-05 RX ADMIN — HYDROCODONE BITARTRATE AND ACETAMINOPHEN 2 TABLET: 5; 325 TABLET ORAL at 23:13

## 2017-03-05 RX ADMIN — HYDROCODONE BITARTRATE AND ACETAMINOPHEN 1 TABLET: 10; 325 TABLET ORAL at 06:47

## 2017-03-05 RX ADMIN — HYDROMORPHONE HYDROCHLORIDE 1 MG: 1 INJECTION, SOLUTION INTRAMUSCULAR; INTRAVENOUS; SUBCUTANEOUS at 20:46

## 2017-03-05 RX ADMIN — HYDROCODONE BITARTRATE AND ACETAMINOPHEN 1 TABLET: 10; 325 TABLET ORAL at 10:25

## 2017-03-05 RX ADMIN — HYDROCODONE BITARTRATE AND ACETAMINOPHEN 1 TABLET: 10; 325 TABLET ORAL at 01:56

## 2017-03-05 RX ADMIN — HYDROCODONE BITARTRATE AND ACETAMINOPHEN 1 TABLET: 10; 325 TABLET ORAL at 18:23

## 2017-03-05 RX ADMIN — ARFORMOTEROL TARTRATE 15 MCG: 15 SOLUTION RESPIRATORY (INHALATION) at 20:57

## 2017-03-05 RX ADMIN — ENOXAPARIN SODIUM 40 MG: 40 INJECTION SUBCUTANEOUS at 18:23

## 2017-03-05 RX ADMIN — HYDROCODONE BITARTRATE AND ACETAMINOPHEN 1 TABLET: 10; 325 TABLET ORAL at 14:18

## 2017-03-05 RX ADMIN — BUDESONIDE 500 MCG: 0.5 INHALANT RESPIRATORY (INHALATION) at 20:57

## 2017-03-05 RX ADMIN — BUDESONIDE 500 MCG: 0.5 INHALANT RESPIRATORY (INHALATION) at 07:33

## 2017-03-05 RX ADMIN — Medication 5 ML: at 23:13

## 2017-03-05 RX ADMIN — LEVOTHYROXINE SODIUM 100 MCG: 100 TABLET ORAL at 06:48

## 2017-03-05 RX ADMIN — ARFORMOTEROL TARTRATE 15 MCG: 15 SOLUTION RESPIRATORY (INHALATION) at 07:33

## 2017-03-05 NOTE — PROGRESS NOTES
Ortho Daily Progress Note    3/5/2017  9:39 AM    POD:  2 Days Post-Op  S/P:  Procedure(s):  ANKLE OPEN REDUCTION INTERNAL FIXATION- RIGHT    Afebrile/VSS  Doing well without complaints of nausea  Pain well controlled  Calves soft/NTTP Bilaterally  Incision OK, no drainage or dehiscence. Dressing/splint clean and dry  Moving lower extremities well. Neurocirculatory exam intact and within normal range.     Lab Results   Component Value Date/Time    HGB 14.2 03/02/2017 04:36 PM         PLAN:  DVT prophylaxis: Lovenox in house, then transition to ASA  NWB with PT-mobilization  Pain Control  Plan to D/C [facility] request denied, awaiting authorization from [address], [place]

## 2017-03-05 NOTE — PROGRESS NOTES
Problem: Mobility Impaired (Adult and Pediatric)  Goal: *Acute Goals and Plan of Care (Insert Text)  Physical Therapy Goals  Initiated 3/4/2017  1. Patient will move from supine to sit and sit to supine in bed with independence within 3 day(s). 2. Patient will transfer from bed to chair and chair to bed with modified independence using the least restrictive device within 3 day(s). 3. Patient will perform sit to stand with modified independence within 3 day(s). 4. Patient will ambulate with moderate assistance for 50 feet with the least restrictive device within 3 day(s). 5. Patient will ascend/descend 5 stairs with 2 handrail(s) with moderate assistance within 3 day(s). PHYSICAL THERAPY TREATMENT  Patient: Margie Mayorga (28 y.o. female)  Date: 3/5/2017  Diagnosis: fx ankle  Bilateral ankle fractures  unknown <principal problem not specified>  Procedure(s) (LRB):  ANKLE OPEN REDUCTION INTERNAL FIXATION- RIGHT (Right) 2 Days Post-Op  Precautions: Fall (NWB RLE, heel only WB LLE)      ASSESSMENT:  Pt was in bed and agreeable to work with PT. She had many questions regarding healing time and pain levels. Pt educated that everyone  Is different and her pain will change once her cast is removed and she can bear weight through her leg. She was very motivated. She stood edge of bed x 5 minutes. She is appropriate for inpt rehab. Progression toward goals:  [X]    Improving appropriately and progressing toward goals  [ ]    Improving slowly and progressing toward goals  [ ]    Not making progress toward goals and plan of care will be adjusted       PLAN:  Patient continues to benefit from skilled intervention to address the above impairments. Continue treatment per established plan of care. Discharge Recommendations:  Rehab  Further Equipment Recommendations for Discharge:  TBD       SUBJECTIVE:   Patient stated [facility] long will I have the pain?       OBJECTIVE DATA SUMMARY:   Critical Behavior:  Neurologic State: Alert  Orientation Level: Oriented X4  Cognition: Follows commands, Appropriate safety awareness, Appropriate for age attention/concentration, Appropriate decision making  Safety/Judgement: Awareness of environment, Fall prevention, Good awareness of safety precautions, Home safety, Insight into deficits  Functional Mobility Training:  Bed Mobility:  Rolling: Supervision  Supine to Sit: Supervision  Sit to Supine: Supervision  Scooting: Supervision        Transfers:     Stand to Sit: Assist x2;Minimum assistance                             Balance:  Sitting: Intact  Standing: Intact; With support  Ambulation/Gait Training:                    Right Side Weight Bearing: Non-weight bearing  Left Side Weight Bearing: Heel                                         Therapeutic Exercises:   SLR  Pain:  Pain Scale 1: Numeric (0 - 10)  Pain Intensity 1: 4  Pain Location 1: Ankle  Pain Orientation 1: (P) Right  Pain Description 1: Throbbing  Pain Intervention(s) 1: (P) Elevation  Activity Tolerance:   fair  Please refer to the flowsheet for vital signs taken during this treatment.   After treatment:   [ ]    Patient left in no apparent distress sitting up in chair  [X]    Patient left in no apparent distress in bed  [X]    Call bell left within reach  [ ]    Nursing notified  [ ]    Caregiver present  [ ]    Bed alarm activated      COMMUNICATION/COLLABORATION:   The patients plan of care was discussed with: Registered Nurse     Jodie Grace, PT   Time Calculation: 18 mins

## 2017-03-05 NOTE — PROGRESS NOTES
Problem: Discharge Planning  Goal: *Discharge to safe environment  Outcome: Progressing Towards Goal  Discharge to [facility] for rehab and medical follow up

## 2017-03-05 NOTE — PROGRESS NOTES
Bedside shift change report given to Cipriano Leger RN (oncoming nurse) by Windell Dakins, RN (offgoing nurse). Report given with SBAR, Kardex, Intake/Output and MAR.

## 2017-03-05 NOTE — PROGRESS NOTES
Liacielo Tariq with [facility] called Cm and sent response in Allscripts indicating the patient does not meet criteria for inpatient rehab. Patient informed and now waiting for response from [facility] [phone] for Snf placement for patient for rehab. Patient informed of denial from [facility].   She is in agreement with [facility] and [phone]

## 2017-03-05 NOTE — PROGRESS NOTES
Bedside and Verbal shift change report given to Horacio Guevara RN (oncoming nurse) by Moses Yi RN (offgoing nurse). Report included the following information SBAR.

## 2017-03-06 VITALS
DIASTOLIC BLOOD PRESSURE: 62 MMHG | OXYGEN SATURATION: 95 % | TEMPERATURE: 97.9 F | RESPIRATION RATE: 16 BRPM | SYSTOLIC BLOOD PRESSURE: 101 MMHG | HEART RATE: 67 BPM

## 2017-03-06 PROCEDURE — 74011250637 HC RX REV CODE- 250/637: Performed by: ORTHOPAEDIC SURGERY

## 2017-03-06 PROCEDURE — 94640 AIRWAY INHALATION TREATMENT: CPT

## 2017-03-06 PROCEDURE — 74011000250 HC RX REV CODE- 250: Performed by: ORTHOPAEDIC SURGERY

## 2017-03-06 PROCEDURE — 97110 THERAPEUTIC EXERCISES: CPT

## 2017-03-06 RX ORDER — ASPIRIN 325 MG
325 TABLET ORAL DAILY
Qty: 30 TAB | Refills: 0 | Status: SHIPPED | OUTPATIENT
Start: 2017-03-06

## 2017-03-06 RX ORDER — HYDROCODONE BITARTRATE AND ACETAMINOPHEN 5; 325 MG/1; MG/1
1 TABLET ORAL
Qty: 40 TAB | Refills: 0 | Status: SHIPPED | OUTPATIENT
Start: 2017-03-06 | End: 2018-02-06

## 2017-03-06 RX ORDER — SENNOSIDES 8.6 MG/1
1 TABLET ORAL 2 TIMES DAILY
Qty: 60 TAB | Refills: 0 | Status: SHIPPED | OUTPATIENT
Start: 2017-03-06

## 2017-03-06 RX ADMIN — Medication 10 ML: at 06:00

## 2017-03-06 RX ADMIN — BUDESONIDE 500 MCG: 0.5 INHALANT RESPIRATORY (INHALATION) at 08:18

## 2017-03-06 RX ADMIN — LEVOTHYROXINE SODIUM 100 MCG: 100 TABLET ORAL at 07:24

## 2017-03-06 RX ADMIN — HYDROCODONE BITARTRATE AND ACETAMINOPHEN 1 TABLET: 10; 325 TABLET ORAL at 13:32

## 2017-03-06 RX ADMIN — HYDROCODONE BITARTRATE AND ACETAMINOPHEN 2 TABLET: 5; 325 TABLET ORAL at 04:44

## 2017-03-06 RX ADMIN — ARFORMOTEROL TARTRATE 15 MCG: 15 SOLUTION RESPIRATORY (INHALATION) at 08:18

## 2017-03-06 RX ADMIN — HYDROCODONE BITARTRATE AND ACETAMINOPHEN 2 TABLET: 5; 325 TABLET ORAL at 09:58

## 2017-03-06 NOTE — PROGRESS NOTES
The patient will discharge today to [facility] at 1:30pm. Via [facility]. Kardex, mars, discharge orders, and ambulance PCS will follow. CRM sent the discharge orders via All Scripts to the facility. Alba Orozco RN and the patient are aware of the discharge plan and the time. The discharge folder is located on the chart with report #.  [id number]

## 2017-03-06 NOTE — PROGRESS NOTES
Problem: Mobility Impaired (Adult and Pediatric)  Goal: *Acute Goals and Plan of Care (Insert Text)  Physical Therapy Goals  Initiated 3/4/2017  1. Patient will move from supine to sit and sit to supine in bed with independence within 3 day(s). 2. Patient will transfer from bed to chair and chair to bed with modified independence using the least restrictive device within 3 day(s). 3. Patient will perform sit to stand with modified independence within 3 day(s). 4. Patient will ambulate with moderate assistance for 50 feet with the least restrictive device within 3 day(s). 5. Patient will ascend/descend 5 stairs with 2 handrail(s) with moderate assistance within 3 day(s). PHYSICAL THERAPY TREATMENT  Patient: [place] (54 y.o. female)  Date: 3/6/2017  Diagnosis: fx ankle  Bilateral ankle fractures  unknown <principal problem not specified>  Procedure(s) (LRB):  ANKLE OPEN REDUCTION INTERNAL FIXATION- RIGHT (Right) 3 Days Post-Op  Precautions: Fall (NWB RLE, heel only WB LLE)  Chart, physical therapy assessment, plan of care and goals were reviewed. ASSESSMENT:  Pt received supine in bed, NSG aide present reported pt just got comfortable in bed after bath. Pt agreeable to PT, NSG set-up chair for pt to sit. Pt able to transfer supine>sit EOB w/ Supervision, however CGA-Min A for sit>stand tranfers x2 w/ RW w/ verbal cues and demonstration for hand placement (one hand on bed and other on RW to assist w/stand). Pt w/ improved sit>stand transfer w/ 2nd attempt requiring less assistance. Pt stand pivot on Left heel w/ CGA-Min for guidance towards chair where pt performed seated exercises (pt also performed supine bed exercises; see below). Pt remained in chair w/ (B)LE elevated on stool w/ pillows for comfort;needs met, items in reach.    Progression toward goals:  [X]      Improving appropriately and progressing toward goals  [ ]      Improving slowly and progressing toward goals  [ ]      Not making progress toward goals and plan of care will be adjusted       PLAN:  Patient continues to benefit from skilled intervention to address the above impairments. Continue treatment per established plan of care. Discharge Recommendations: Rehab  Further Equipment Recommendations for Discharge:  TBD at rehab       SUBJECTIVE:   Patient stated Will I get more stable? \"  OBJECTIVE DATA SUMMARY:   Critical Behavior:  Neurologic State: Alert  Orientation Level: Oriented X4  Cognition: Appropriate decision making, Appropriate for age attention/concentration, Appropriate safety awareness, Follows commands  Safety/Judgement: Awareness of environment, Fall prevention, Good awareness of safety precautions, Home safety, Insight into deficits  Functional Mobility Training:  Bed Mobility:     Supine to Sit: Supervision  Sit to Supine:  (pt returned to chair)                       Transfers:  Sit to Stand: Minimum assistance; Additional time;Assist x1 (x2)  Stand to Sit: Contact guard assistance        Bed to Chair: Contact guard assistance;Minimum assistance (Light Min A)                    Balance:  Sitting: Intact  Standing: Intact; With support  Standing - Static: Constant support;Good  Standing - Dynamic : Fair  Ambulation/Gait Training:                    Right Side Weight Bearing: Non-weight bearing  Left Side Weight Bearing:  (WB on L heel only)  Base of Support: Narrowed                                        Stairs:              Neuro Re-Education:     Therapeutic Exercises (BLE):   Supine (x10 each): SLR, glut sets, quad sets  Seated: Chair push ups (2x10), Hip Flexion (x10), Hip ABD w/ manual resistance (x15), Hip ADD squeezes w/pillow (x15), LAQ (x15)  Pain:  Pain Scale 1: Numeric (0 - 10)  Pain Intensity 1: 3  Pain Location 1: Ankle  Pain Orientation 1: Left;Right  Pain Description 1: Aching  Pain Intervention(s) 1: Medication (see MAR)  Activity Tolerance:   Limited  Please refer to the flowsheet for vital signs taken during this treatment.   After treatment:   [X] Patient left in no apparent distress sitting up in chair  [ ] Patient left in no apparent distress in bed  [X] Call bell left within reach  [X] Nursing notified  [ ] Caregiver present  [ ] Bed alarm activated      COMMUNICATION/COLLABORATION:   The patients plan of care was discussed with: Registered Nurse Iris Marlow,PTA   Time Calculation: 18 mins

## 2017-03-06 NOTE — PROGRESS NOTES
Bedside shift change report given to Lamberto Montesinos RN (oncoming nurse) by Jayleen Tucker (offgoing nurse). Report included the following information SBAR, Kardex, Intake/Output and MAR.

## 2017-03-06 NOTE — PROGRESS NOTES
Spiritual Care Partner Volunteer visited patient in [address] [place] [address] on 03/06/17. Documented by:    Rasheeda Lopez M.S., M.Div.   [address] [place] [address] Dayo ([id number])

## 2017-03-06 NOTE — PROGRESS NOTES
Report called to nurse Sari Cornelius at [facility]. Patient is prepared for transfer, with all belongings and documents.

## 2017-03-06 NOTE — PROGRESS NOTES
Ortho Daily Progress Note    3/6/2017  10:30 AM    POD:  3 Days Post-Op  S/P:  Procedure(s):  ANKLE OPEN REDUCTION INTERNAL FIXATION- RIGHT    Afebrile/VSS  Doing well without complaints of nausea  Pain well controlled  Calves soft/NTTP Bilaterally  Cam boot LLE  Dressing/splint clean and dry, RLE  Moving lower extremities well. Wiggling toes  Neurocirculatory exam intact and within normal range.     Lab Results   Component Value Date/Time    HGB 14.2 03/02/2017 04:36 PM         PLAN:  DVT prophylaxis: Lovenox to ASA  NWB with PT-mobilization  Pain Control  Plan to D/C waiting on authorization for rehab facility- expected today      Dewayne Real, [address]

## 2017-03-06 NOTE — PROGRESS NOTES
Bedside shift change report given to Crys Suárez (oncoming nurse) by Kathryn Garcia (offgoing nurse). Report included the following information SBAR and Kardex.

## 2017-03-12 NOTE — DISCHARGE SUMMARY
[address] SUMMARY       Name:  Marylee Bogus   MR#:  [id number]   :  1950   Account #:  [de-identified]        Date of Adm:  2017       DIAGNOSES   1. Right ankle trimalleolar fracture requiring surgery. 2. Left foot fifth metatarsal fracture. PROCEDURES PERFORMED:  Open reduction internal fixation, right   ankle trimalleolar fracture on 2017. HISTORY AND HOSPITAL COURSE: The patient is a 26-year-old   female who fell several days prior to 2017. She presented to an   outside hospital with an obvious fracture dislocation of her right ankle. This was reduced and splinted in the emergency room and she was   actually sent home. She lives alone. She came to my office a few days   later. She was also having left foot pain as well. We took x-rays and   found out that she also had sustained a left foot fifth metatarsal   fracture, so she had a right ankle fracture requiring surgery as well as   a left foot fracture. She was really unable to take care of herself, so we   ended up admitting her to the hospital. She ended up undergoing open   reduction and internal fixation of the right ankle on 2017. Postoperatively, she was on the orthopedic floor. She worked with the   physical therapy service, but she had to be nonweightbearing on the   right leg. She could transfer only on her left foot in a CAM boot due to   the fifth metatarsal fracture and it was deemed that she would need to   go to rehab once rehab was set up. The patient was discharged to a   rehab facility on 2017 with instructions to continue as above and   to followup with myself in the office.           MD MAURY Baez / ROWAN   D:  2017   12:21   T:  2017   19:43   Job #:  [id number]

## 2018-02-06 ENCOUNTER — HOSPITAL ENCOUNTER (EMERGENCY)
Age: 68
Discharge: HOME | End: 2018-02-06
Attending: FAMILY MEDICINE

## 2018-02-06 ENCOUNTER — APPOINTMENT (OUTPATIENT)
Dept: GENERAL RADIOLOGY | Age: 68
End: 2018-02-06
Attending: FAMILY MEDICINE

## 2018-02-06 VITALS
WEIGHT: 182.3 LBS | DIASTOLIC BLOOD PRESSURE: 59 MMHG | RESPIRATION RATE: 18 BRPM | HEART RATE: 72 BPM | BODY MASS INDEX: 28.61 KG/M2 | SYSTOLIC BLOOD PRESSURE: 130 MMHG | OXYGEN SATURATION: 96 % | TEMPERATURE: 97.8 F | HEIGHT: 67 IN

## 2018-02-06 DIAGNOSIS — J20.9 ACUTE BRONCHITIS, UNSPECIFIED ORGANISM: Primary | ICD-10-CM

## 2018-02-06 LAB
INFLUENZA A AG (POC): NEGATIVE
INFLUENZA AG (POC) NEGATIVE CTRL.: NORMAL
INFLUENZA AG (POC) POSITIVE CTRL.: NORMAL
INFLUENZA B AG (POC): NEGATIVE
LOT NUMBER POC: NORMAL

## 2018-02-06 RX ORDER — BENZONATATE 200 MG/1
200 CAPSULE ORAL
Qty: 30 CAP | Refills: 0 | Status: SHIPPED | OUTPATIENT
Start: 2018-02-06 | End: 2018-10-23 | Stop reason: ALTCHOICE

## 2018-02-06 RX ORDER — AZITHROMYCIN 250 MG/1
TABLET, FILM COATED ORAL
Qty: 6 TAB | Refills: 0 | Status: SHIPPED | OUTPATIENT
Start: 2018-02-06 | End: 2018-10-23 | Stop reason: ALTCHOICE

## 2018-02-06 NOTE — UC PROVIDER NOTE
Patient is a 76 y.o. female presenting with cough. The history is provided by the patient. Cough   This is a new problem. Episode onset: 1 week ago. The problem occurs every few minutes. The problem has been gradually worsening. The cough is productive of sputum. Maximum temperature: low grade. The fever has been present for 5 days or more. Associated symptoms include rhinorrhea. Pertinent negatives include no chest pain, no chills, no sweats, no ear congestion, no ear pain, no sore throat, no myalgias, no shortness of breath and no wheezing. She has tried decongestants for the symptoms. The treatment provided no relief. Her past medical history is significant for asthma. Past Medical History:   Diagnosis Date    Arthritis     left thumb basilar joint arthritis    Asthma     Cancer ([facility] [address]     breast    Plantar fasciitis     bilateral    Seasonal allergies     Unspecified hypothyroidism         Past Surgical History:   Procedure Laterality Date    HX BREAST LUMPECTOMY  2011    HX GYN      hysterectomy    THYROIDECTOMY           Family History   Problem Relation Age of Onset    Cancer Mother      ovarian    Hypertension Father     Heart Disease Father     Cancer Maternal Grandmother      colon    Diabetes Maternal Grandmother         Social History     Social History    Marital status:      Spouse name: N/A    Number of children: N/A    Years of education: N/A     Occupational History    Not on file. Social History Main Topics    Smoking status: Former Smoker    Smokeless tobacco: Not on file    Alcohol use No    Drug use: No    Sexual activity: Not on file     Other Topics Concern    Not on file     Social History Narrative                ALLERGIES: Sulfa (sulfonamide antibiotics)    Review of Systems   Constitutional: Positive for fever. Negative for chills. HENT: Positive for congestion and rhinorrhea. Negative for ear pain and sore throat.     Respiratory: Positive for cough. Negative for shortness of breath and wheezing. Cardiovascular: Negative for chest pain and palpitations. Musculoskeletal: Negative for myalgias. Vitals:    02/06/18 1309   BP: 130/59   Pulse: 72   Resp: 18   Temp: 97.8 °F (36.6 °C)   SpO2: 96%   Weight: 82.7 kg (182 lb 4.8 oz)   Height: 5' 7\" (1.702 m)       Physical Exam   Constitutional: She appears well-developed and well-nourished. No distress. HENT:   Right Ear: Tympanic membrane, external ear and ear canal normal.   Left Ear: Tympanic membrane, external ear and ear canal normal.   Nose: Rhinorrhea present. Right sinus exhibits no maxillary sinus tenderness and no frontal sinus tenderness. Left sinus exhibits no maxillary sinus tenderness and no frontal sinus tenderness. Mouth/Throat: Oropharynx is clear and moist and mucous membranes are normal. No oropharyngeal exudate, posterior oropharyngeal edema, posterior oropharyngeal erythema or tonsillar abscesses. Cardiovascular: Normal rate, regular rhythm and normal heart sounds. Pulmonary/Chest: Effort normal. No respiratory distress. She has decreased breath sounds in the left lower field. She has no wheezes. She has no rhonchi. She has no rales. Lymphadenopathy:     She has no cervical adenopathy. Neurological: She is alert. Skin: She is not diaphoretic. Psychiatric: She has a normal mood and affect. Her behavior is normal. Judgment and thought content normal.   Nursing note and vitals reviewed. MDM     Differential Diagnosis; Clinical Impression; Plan:     CLINICAL IMPRESSION:  Acute bronchitis, unspecified organism  (primary encounter diagnosis)    Plan:  1. Zpak  2. Tessalon prn  3. PCP INI  Amount and/or Complexity of Data Reviewed:   Clinical lab tests:  Ordered and reviewed  Tests in the radiology section of CPT®:  Ordered and reviewed  Risk of Significant Complications, Morbidity, and/or Mortality:   Presenting problems:   Moderate  Diagnostic procedures: Moderate  Management options:   Moderate  Progress:   Patient progress:  Stable      Procedures

## 2018-02-06 NOTE — DISCHARGE INSTRUCTIONS
Bronchitis: Care Instructions  Your Care Instructions    Bronchitis is inflammation of the bronchial tubes, which carry air to the lungs. The tubes swell and produce mucus, or phlegm. The mucus and inflamed bronchial tubes make you cough. You may have trouble breathing. Most cases of bronchitis are caused by viruses like those that cause colds. Antibiotics usually do not help and they may be harmful. Bronchitis usually develops rapidly and lasts about 2 to 3 weeks in otherwise healthy people. Follow-up care is a key part of your treatment and safety. Be sure to make and go to all appointments, and call your doctor if you are having problems. It's also a good idea to know your test results and keep a list of the medicines you take. How can you care for yourself at home? · Take all medicines exactly as prescribed. Call your doctor if you think you are having a problem with your medicine. · Get some extra rest.  · Take an over-the-counter pain medicine, such as acetaminophen (Tylenol), ibuprofen (Advil, Motrin), or naproxen (Aleve) to reduce fever and relieve body aches. Read and follow all instructions on the label. · Do not take two or more pain medicines at the same time unless the doctor told you to. Many pain medicines have acetaminophen, which is Tylenol. Too much acetaminophen (Tylenol) can be harmful. · Take an over-the-counter cough medicine that contains dextromethorphan to help quiet a dry, hacking cough so that you can sleep. Avoid cough medicines that have more than one active ingredient. Read and follow all instructions on the label. · Breathe moist air from a humidifier, hot shower, or sink filled with hot water. The heat and moisture will thin mucus so you can cough it out. · Do not smoke. Smoking can make bronchitis worse. If you need help quitting, talk to your doctor about stop-smoking programs and medicines. These can increase your chances of quitting for good.   When should you call for help? Call 911 anytime you think you may need emergency care. For example, call if:  ? · You have severe trouble breathing. ?Call your doctor now or seek immediate medical care if:  ? · You have new or worse trouble breathing. ? · You cough up dark brown or bloody mucus (sputum). ? · You have a new or higher fever. ? · You have a new rash. ? Watch closely for changes in your health, and be sure to contact your doctor if:  ? · You cough more deeply or more often, especially if you notice more mucus or a change in the color of your mucus. ? · You are not getting better as expected. Where can you learn more? Go to http://terence-lucas.info/. Enter H333 in the search box to learn more about \"Bronchitis: Care Instructions. \"  Current as of: May 12, 2017  Content Version: 11.4  © 6738-7184 [employer]. Care instructions adapted under license by [employer] (which disclaims liability or warranty for this information). If you have questions about a medical condition or this instruction, always ask your healthcare professional. [address] any warranty or liability for your use of this information.

## 2018-10-23 ENCOUNTER — HOSPITAL ENCOUNTER (OUTPATIENT)
Dept: GENERAL RADIOLOGY | Age: 68
Discharge: HOME | End: 2018-10-23
Attending: FAMILY MEDICINE
Payer: MEDICARE

## 2018-10-23 DIAGNOSIS — M79.604 PAIN OF RIGHT LOWER EXTREMITY: ICD-10-CM

## 2018-10-23 PROCEDURE — 73630 X-RAY EXAM OF FOOT: CPT

## 2019-12-16 ENCOUNTER — OFFICE VISIT (OUTPATIENT)
Dept: URGENT CARE | Age: 69
End: 2019-12-16

## 2019-12-16 VITALS
WEIGHT: 175 LBS | HEIGHT: 67 IN | BODY MASS INDEX: 27.47 KG/M2 | SYSTOLIC BLOOD PRESSURE: 125 MMHG | OXYGEN SATURATION: 97 % | TEMPERATURE: 98.6 F | RESPIRATION RATE: 18 BRPM | DIASTOLIC BLOOD PRESSURE: 57 MMHG | HEART RATE: 90 BPM

## 2019-12-16 DIAGNOSIS — R09.82 POST-NASAL DRIP: ICD-10-CM

## 2019-12-16 DIAGNOSIS — R05.9 COUGH: ICD-10-CM

## 2019-12-16 DIAGNOSIS — J01.90 ACUTE SINUSITIS, RECURRENCE NOT SPECIFIED, UNSPECIFIED LOCATION: Primary | ICD-10-CM

## 2019-12-16 RX ORDER — ALBUTEROL SULFATE 90 UG/1
AEROSOL, METERED RESPIRATORY (INHALATION)
COMMUNITY

## 2019-12-16 RX ORDER — DOXYCYCLINE 100 MG/1
100 CAPSULE ORAL 2 TIMES DAILY
Qty: 14 CAP | Refills: 0 | Status: SHIPPED | OUTPATIENT
Start: 2019-12-16 | End: 2019-12-23

## 2019-12-16 RX ORDER — BENZONATATE 200 MG/1
200 CAPSULE ORAL
Qty: 21 CAP | Refills: 0 | Status: SHIPPED | OUTPATIENT
Start: 2019-12-16 | End: 2019-12-23

## 2019-12-16 NOTE — PROGRESS NOTES
70 yo female with hx of asthma here for 10 + days of sinus pressure, post nasal drip and cough. She has not had resolution with OTC meds  Denies any flare up of asthma symptos or need for her rescue inhaler  She denies fevers, weakness, body aches, SOB  No other associated symptoms  Overall worsening. Past Medical History:   Diagnosis Date    Asthma     Cancer [facility])     breast cancer right lumpectomy    Endocrine disease         History reviewed. No pertinent surgical history. History reviewed. No pertinent family history.      Social History     Socioeconomic History    Marital status: UNKNOWN     Spouse name: Not on file    Number of children: Not on file    Years of education: Not on file    Highest education level: Not on file   Occupational History    Not on file   Social Needs    Financial resource strain: Not on file    Food insecurity:     Worry: Not on file     Inability: Not on file    Transportation needs:     Medical: Not on file     Non-medical: Not on file   Tobacco Use    Smoking status: Former Smoker    Smokeless tobacco: Never Used   Substance and Sexual Activity    Alcohol use: Not on file    Drug use: Not on file    Sexual activity: Not on file   Lifestyle    Physical activity:     Days per week: Not on file     Minutes per session: Not on file    Stress: Not on file   Relationships    Social connections:     Talks on phone: Not on file     Gets together: Not on file     Attends Confucianist service: Not on file     Active member of club or organization: Not on file     Attends meetings of clubs or organizations: Not on file     Relationship status: Not on file    Intimate partner violence:     Fear of current or ex partner: Not on file     Emotionally abused: Not on file     Physically abused: Not on file     Forced sexual activity: Not on file   Other Topics Concern    Not on file   Social History Narrative    Not on file                ALLERGIES: Sulfa (sulfonamide antibiotics)    Review of Systems   Constitutional: Negative for chills and fever. Respiratory: Negative for chest tightness and wheezing. Cardiovascular: Negative for chest pain, palpitations and leg swelling. Neurological: Negative for dizziness and weakness. All other systems reviewed and are negative. Vitals:    12/16/19 1116   BP: 125/57   Pulse: 90   Resp: 18   Temp: 98.6 °F (37 °C)   SpO2: 97%   Weight: 175 lb (79.4 kg)   Height: 5' 7\" (1.702 m)       Physical Exam  Vitals signs and nursing note reviewed. Constitutional:       General: She is not in acute distress. Appearance: She is not diaphoretic. Comments: Non-toxic appearing, well hydrated   HENT:      Nose: Congestion present. Comments: Maxillary sinus TTP bilat. Posterior pharynx wall thick mucus present. No swelling, exudate or lesion. Eyes:      General: No scleral icterus. Extraocular Movements: Extraocular movements intact. Conjunctiva/sclera: Conjunctivae normal.      Pupils: Pupils are equal, round, and reactive to light. Neck:      Musculoskeletal: Normal range of motion and neck supple. No neck rigidity. Cardiovascular:      Rate and Rhythm: Normal rate and regular rhythm. Heart sounds: Normal heart sounds. No murmur. No friction rub. No gallop. Pulmonary:      Effort: Pulmonary effort is normal. No respiratory distress. Breath sounds: Normal breath sounds. No stridor. No wheezing, rhonchi or rales. Musculoskeletal: Normal range of motion. General: No swelling or tenderness. Lymphadenopathy:      Cervical: No cervical adenopathy. Skin:     General: Skin is warm and dry. Capillary Refill: Capillary refill takes less than 2 seconds. Coloration: Skin is not pale. Findings: No erythema or rash. Neurological:      Mental Status: She is alert and oriented to person, place, and time. Cranial Nerves: No cranial nerve deficit.    Psychiatric: Behavior: Behavior normal.         Thought Content: Thought content normal.         Judgment: Judgment normal.         MDM     Differential Diagnosis; Clinical Impression; Plan:       CLINICAL IMPRESSION:  (J01.90) Acute sinusitis, recurrence not specified, unspecified location  (primary encounter diagnosis)  (R09.82) Post-nasal drip  (R05) Cough    Orders Placed This Encounter      doxycycline (VIBRAMYCIN) 100 mg capsule          Sig: Take 1 Cap by mouth two (2) times a day for 7 days. Dispense:  14 Cap          Refill:  0      benzonatate (TESSALON) 200 mg capsule          Sig: Take 1 Cap by mouth three (3) times daily as needed for Cough for up to 7 days. Dispense:  21 Cap          Refill:  0      Plan:  Start doxycycline for sinusitis  Tessalon for cough relief  Nasal saline sprays  Albuterol as previously prescribed for wheezing/chest tightness  Review handouts         We have reviewed concerning signs/symptoms, normal vs abnormal progression of medical condition and when to seek immediate medical attention. Schedule with PCP or Urgent Care immediately for worsening or new symptoms. See your PCP if there is not at least some improvement in symptoms within the next 5-7 days  You should see your PCP for updates on your routine health maintenance.              Procedures

## 2019-12-16 NOTE — PATIENT INSTRUCTIONS
Follow up with PCP without improvement over next 5-7 days sooner/immediately for new or worsening Sinusitis: Care Instructions Your Care Instructions Sinusitis is an infection of the lining of the sinus cavities in your head. Sinusitis often follows a cold. It causes pain and pressure in your head and face. In most cases, sinusitis gets better on its own in 1 to 2 weeks. But some mild symptoms may last for several weeks. Sometimes antibiotics are needed. Follow-up care is a key part of your treatment and safety. Be sure to make and go to all appointments, and call your doctor if you are having problems. It's also a good idea to know your test results and keep a list of the medicines you take. How can you care for yourself at home? · Take an over-the-counter pain medicine, such as acetaminophen (Tylenol), ibuprofen (Advil, Motrin), or naproxen (Aleve). Read and follow all instructions on the label. · If the doctor prescribed antibiotics, take them as directed. Do not stop taking them just because you feel better. You need to take the full course of antibiotics. · Be careful when taking over-the-counter cold or flu medicines and Tylenol at the same time. Many of these medicines have acetaminophen, which is Tylenol. Read the labels to make sure that you are not taking more than the recommended dose. Too much acetaminophen (Tylenol) can be harmful. · Breathe warm, moist air from a steamy shower, a hot bath, or a sink filled with hot water. Avoid cold, dry air. Using a humidifier in your home may help. Follow the directions for cleaning the machine. · Use saline (saltwater) nasal washes to help keep your nasal passages open and wash out mucus and bacteria. You can buy saline nose drops at a grocery store or drugstore.  Or you can make your own at home by adding 1 teaspoon of salt and 1 teaspoon of baking soda to 2 cups of distilled water. If you make your own, fill a bulb syringe with the solution, insert the tip into your nostril, and squeeze gently. [place] your nose. · Put a hot, wet towel or a warm gel pack on your face 3 or 4 times a day for 5 to 10 minutes each time. · Try a decongestant nasal spray like oxymetazoline (Afrin). Do not use it for more than 3 days in a row. Using it for more than 3 days can make your congestion worse. When should you call for help? Call your doctor now or seek immediate medical care if: 
  · You have new or worse swelling or redness in your face or around your eyes.  
  · You have a new or higher fever.  
 Watch closely for changes in your health, and be sure to contact your doctor if: 
  · You have new or worse facial pain.  
  · The mucus from your nose becomes thicker (like pus) or has new blood in it.  
  · You are not getting better as expected. Where can you learn more? Go to http://terence-lucas.info/. Enter M909 in the search box to learn more about \"Sinusitis: Care Instructions. \" Current as of: October 21, 2018 Content Version: 12.2 © 1774-5208 [employer]. Care instructions adapted under license by [employer] (which disclaims liability or warranty for this information). If you have questions about a medical condition or this instruction, always ask your healthcare professional. [place] [address] any warranty or liability for your use of this information.

## 2020-02-04 ENCOUNTER — OFFICE VISIT (OUTPATIENT)
Dept: URGENT CARE | Age: 70
End: 2020-02-04

## 2020-02-04 VITALS
SYSTOLIC BLOOD PRESSURE: 124 MMHG | OXYGEN SATURATION: 96 % | DIASTOLIC BLOOD PRESSURE: 64 MMHG | BODY MASS INDEX: 26.84 KG/M2 | WEIGHT: 171 LBS | RESPIRATION RATE: 18 BRPM | TEMPERATURE: 97.9 F | HEART RATE: 99 BPM | HEIGHT: 67 IN

## 2020-02-04 DIAGNOSIS — B96.89 ACUTE BACTERIAL SINUSITIS: Primary | ICD-10-CM

## 2020-02-04 DIAGNOSIS — J01.90 ACUTE BACTERIAL SINUSITIS: Primary | ICD-10-CM

## 2020-02-04 DIAGNOSIS — J20.9 ACUTE WHEEZY BRONCHITIS: ICD-10-CM

## 2020-02-04 RX ORDER — DOXYCYCLINE 100 MG/1
100 CAPSULE ORAL 2 TIMES DAILY
Qty: 20 CAP | Refills: 0 | Status: SHIPPED | OUTPATIENT
Start: 2020-02-04 | End: 2020-02-14

## 2020-02-04 RX ORDER — TRIAMCINOLONE ACETONIDE 55 UG/1
2 SPRAY, METERED NASAL DAILY
Qty: 1 BOTTLE | Refills: 0 | Status: SHIPPED | OUTPATIENT
Start: 2020-02-04

## 2020-02-04 RX ORDER — PREDNISONE 10 MG/1
TABLET ORAL
Qty: 16 TAB | Refills: 0 | Status: SHIPPED | OUTPATIENT
Start: 2020-02-04 | End: 2022-02-25 | Stop reason: ALTCHOICE

## 2020-02-04 NOTE — PROGRESS NOTES
Here for sinus pain, nasal discharge, nasal congestion and cough x 3 weeks. No fevers, chills, SOB, night sweats, dizziness of malaise  She has not tried any meds  Was treated for similar approx 2 months ago and improved with treatment  No aggravating or alleviating factors  Overall worsening  Hx of asthma, is uing advair and albuterol as prescribed. Past Medical History:   Diagnosis Date    Asthma     Cancer [facility])     breast cancer right lumpectomy    Endocrine disease         History reviewed. No pertinent surgical history. History reviewed. No pertinent family history.      Social History     Socioeconomic History    Marital status: UNKNOWN     Spouse name: Not on file    Number of children: Not on file    Years of education: Not on file    Highest education level: Not on file   Occupational History    Not on file   Social Needs    Financial resource strain: Not on file    Food insecurity:     Worry: Not on file     Inability: Not on file    Transportation needs:     Medical: Not on file     Non-medical: Not on file   Tobacco Use    Smoking status: Former Smoker    Smokeless tobacco: Never Used   Substance and Sexual Activity    Alcohol use: Not on file    Drug use: Not on file    Sexual activity: Not on file   Lifestyle    Physical activity:     Days per week: Not on file     Minutes per session: Not on file    Stress: Not on file   Relationships    Social connections:     Talks on phone: Not on file     Gets together: Not on file     Attends Buddhist service: Not on file     Active member of club or organization: Not on file     Attends meetings of clubs or organizations: Not on file     Relationship status: Not on file    Intimate partner violence:     Fear of current or ex partner: Not on file     Emotionally abused: Not on file     Physically abused: Not on file     Forced sexual activity: Not on file   Other Topics Concern    Not on file   Social History Narrative  Not on file                ALLERGIES: Sulfa (sulfonamide antibiotics)    Review of Systems   All other systems reviewed and are negative. Vitals:    02/04/20 0822   BP: 124/64   Pulse: 99   Resp: 18   Temp: 97.9 °F (36.6 °C)   SpO2: 96%   Weight: 171 lb (77.6 kg)   Height: 5' 7\" (1.702 m)       Physical Exam  Vitals signs and nursing note reviewed. Constitutional:       General: She is not in acute distress. Appearance: Normal appearance. She is not diaphoretic. Comments: Non-toxic appearing, well hydrated   HENT:      Right Ear: Tympanic membrane normal.      Left Ear: Tympanic membrane normal.      Nose: Congestion and rhinorrhea present. Comments: Edematous nasal turbinates with mucus in passages bilat     Mouth/Throat:      Mouth: Mucous membranes are moist.      Pharynx: No oropharyngeal exudate or posterior oropharyngeal erythema. Eyes:      General: No scleral icterus. Extraocular Movements: Extraocular movements intact. Conjunctiva/sclera: Conjunctivae normal.      Pupils: Pupils are equal, round, and reactive to light. Neck:      Musculoskeletal: Normal range of motion and neck supple. No neck rigidity. Cardiovascular:      Rate and Rhythm: Normal rate and regular rhythm. Heart sounds: Normal heart sounds. No murmur. No friction rub. No gallop. Pulmonary:      Effort: Pulmonary effort is normal.      Breath sounds: Normal breath sounds. No stridor. No rhonchi or rales. Comments: Few soft scattered wheezes bilat  Lymphadenopathy:      Cervical: No cervical adenopathy. Skin:     General: Skin is warm and dry. Capillary Refill: Capillary refill takes less than 2 seconds. Coloration: Skin is not pale. Findings: No erythema or rash. Neurological:      Mental Status: She is alert and oriented to person, place, and time. Cranial Nerves: No cranial nerve deficit.    Psychiatric:         Behavior: Behavior normal.         Thought Content: Thought content normal.         Judgment: Judgment normal.         MDM     Differential Diagnosis; Clinical Impression; Plan:       CLINICAL IMPRESSION:  (J01.90,  B96.89) Acute bacterial sinusitis  (primary encounter diagnosis)  (J20.9) Acute wheezy bronchitis    Orders Placed This Encounter      doxycycline (VIBRAMYCIN) 100 mg capsule          Sig: Take 1 Cap by mouth two (2) times a day for 10 days. Dispense:  20 Cap          Refill:  0      predniSONE (DELTASONE) 10 mg tablet          Sig: Take 4 tabs by mouth once daily with food for 4 days. Dispense:  16 Tab          Refill:  0      triamcinolone (NASACORT AQ) 55 mcg nasal inhaler          Si Sprays by Both Nostrils route daily. Dispense:  1 Bottle          Refill:  0      Plan:  Start doxycycline and nasacort  Short burst of predisone for possible asthmatic component; continue albuterol and advair  Review handouts  Advised PCP re eval in 5-7 days      We have reviewed concerning signs/symptoms, normal vs abnormal progression of medical condition and when to seek immediate medical attention. Schedule with PCP or Urgent Care immediately for worsening or new symptoms. You should see your PCP for updates on your routine health maintenance.              Procedures

## 2020-02-04 NOTE — PATIENT INSTRUCTIONS
You need to call your PCP TODAY to schedule follow up re evaluation in 1 week. Return immediately for new, worsening or changes Sinusitis: Care Instructions Your Care Instructions Sinusitis is an infection of the lining of the sinus cavities in your head. Sinusitis often follows a cold. It causes pain and pressure in your head and face. In most cases, sinusitis gets better on its own in 1 to 2 weeks. But some mild symptoms may last for several weeks. Sometimes antibiotics are needed. Follow-up care is a key part of your treatment and safety. Be sure to make and go to all appointments, and call your doctor if you are having problems. It's also a good idea to know your test results and keep a list of the medicines you take. How can you care for yourself at home? · Take an over-the-counter pain medicine, such as acetaminophen (Tylenol), ibuprofen (Advil, Motrin), or naproxen (Aleve). Read and follow all instructions on the label. · If the doctor prescribed antibiotics, take them as directed. Do not stop taking them just because you feel better. You need to take the full course of antibiotics. · Be careful when taking over-the-counter cold or flu medicines and Tylenol at the same time. Many of these medicines have acetaminophen, which is Tylenol. Read the labels to make sure that you are not taking more than the recommended dose. Too much acetaminophen (Tylenol) can be harmful. · Breathe warm, moist air from a steamy shower, a hot bath, or a sink filled with hot water. Avoid cold, dry air. Using a humidifier in your home may help. Follow the directions for cleaning the machine. · Use saline (saltwater) nasal washes to help keep your nasal passages open and wash out mucus and bacteria. You can buy saline nose drops at a grocery store or drugstore.  Or you can make your own at home by adding 1 teaspoon of salt and 1 teaspoon of baking soda to 2 cups of distilled water. If you make your own, fill a bulb syringe with the solution, insert the tip into your nostril, and squeeze gently. Melvia [place] your nose. · Put a hot, wet towel or a warm gel pack on your face 3 or 4 times a day for 5 to 10 minutes each time. · Try a decongestant nasal spray like oxymetazoline (Afrin). Do not use it for more than 3 days in a row. Using it for more than 3 days can make your congestion worse. When should you call for help? Call your doctor now or seek immediate medical care if: 
  · You have new or worse swelling or redness in your face or around your eyes.  
  · You have a new or higher fever.  
 Watch closely for changes in your health, and be sure to contact your doctor if: 
  · You have new or worse facial pain.  
  · The mucus from your nose becomes thicker (like pus) or has new blood in it.  
  · You are not getting better as expected. Where can you learn more? Go to http://terence-lucas.info/. Enter X490 in the search box to learn more about \"Sinusitis: Care Instructions. \" Current as of: October 21, 2018 Content Version: 12.2 © 6171-0495 [employer]. Care instructions adapted under license by [employer] (which disclaims liability or warranty for this information). If you have questions about a medical condition or this instruction, always ask your healthcare professional. [place] [address] any warranty or liability for your use of this information.

## 2022-03-19 PROBLEM — S82.892A BILATERAL ANKLE FRACTURES: Status: ACTIVE | Noted: 2017-03-02

## 2022-03-19 PROBLEM — S82.891A BILATERAL ANKLE FRACTURES: Status: ACTIVE | Noted: 2017-03-02
